# Patient Record
Sex: FEMALE | Race: WHITE | Employment: FULL TIME | URBAN - METROPOLITAN AREA
[De-identification: names, ages, dates, MRNs, and addresses within clinical notes are randomized per-mention and may not be internally consistent; named-entity substitution may affect disease eponyms.]

---

## 2019-03-10 ENCOUNTER — APPOINTMENT (OUTPATIENT)
Dept: GENERAL RADIOLOGY | Age: 49
End: 2019-03-10
Payer: COMMERCIAL

## 2019-03-10 ENCOUNTER — HOSPITAL ENCOUNTER (EMERGENCY)
Age: 49
Discharge: HOME | End: 2019-03-10
Attending: EMERGENCY MEDICINE
Payer: COMMERCIAL

## 2019-03-10 VITALS
HEIGHT: 64 IN | OXYGEN SATURATION: 95 % | SYSTOLIC BLOOD PRESSURE: 113 MMHG | TEMPERATURE: 98.1 F | WEIGHT: 125 LBS | DIASTOLIC BLOOD PRESSURE: 70 MMHG | BODY MASS INDEX: 21.34 KG/M2 | RESPIRATION RATE: 15 BRPM | HEART RATE: 95 BPM

## 2019-03-10 DIAGNOSIS — R00.2 PALPITATIONS: ICD-10-CM

## 2019-03-10 DIAGNOSIS — R07.89 ATYPICAL CHEST PAIN: Primary | ICD-10-CM

## 2019-03-10 LAB
A/G RATIO: 1.7 (ref 1.1–2.2)
ALBUMIN SERPL-MCNC: 4.6 G/DL (ref 3.4–5)
ALP BLD-CCNC: 52 U/L (ref 40–129)
ALT SERPL-CCNC: 13 U/L (ref 10–40)
ANION GAP SERPL CALCULATED.3IONS-SCNC: 15 MMOL/L (ref 3–16)
AST SERPL-CCNC: 26 U/L (ref 15–37)
BASOPHILS ABSOLUTE: 0 K/UL (ref 0–0.2)
BASOPHILS RELATIVE PERCENT: 0.7 %
BILIRUB SERPL-MCNC: 0.7 MG/DL (ref 0–1)
BUN BLDV-MCNC: 15 MG/DL (ref 7–20)
CALCIUM SERPL-MCNC: 9.2 MG/DL (ref 8.3–10.6)
CHLORIDE BLD-SCNC: 101 MMOL/L (ref 99–110)
CO2: 25 MMOL/L (ref 21–32)
CREAT SERPL-MCNC: 0.6 MG/DL (ref 0.6–1.1)
EOSINOPHILS ABSOLUTE: 0.1 K/UL (ref 0–0.6)
EOSINOPHILS RELATIVE PERCENT: 1 %
GFR AFRICAN AMERICAN: >60
GFR NON-AFRICAN AMERICAN: >60
GLOBULIN: 2.7 G/DL
GLUCOSE BLD-MCNC: 95 MG/DL (ref 70–99)
HCT VFR BLD CALC: 42.4 % (ref 36–48)
HEMOGLOBIN: 14.5 G/DL (ref 12–16)
LYMPHOCYTES ABSOLUTE: 1.7 K/UL (ref 1–5.1)
LYMPHOCYTES RELATIVE PERCENT: 30.3 %
MCH RBC QN AUTO: 32.9 PG (ref 26–34)
MCHC RBC AUTO-ENTMCNC: 34.3 G/DL (ref 31–36)
MCV RBC AUTO: 96 FL (ref 80–100)
MONOCYTES ABSOLUTE: 0.4 K/UL (ref 0–1.3)
MONOCYTES RELATIVE PERCENT: 7.4 %
NEUTROPHILS ABSOLUTE: 3.4 K/UL (ref 1.7–7.7)
NEUTROPHILS RELATIVE PERCENT: 60.6 %
PDW BLD-RTO: 13.1 % (ref 12.4–15.4)
PLATELET # BLD: 237 K/UL (ref 135–450)
PMV BLD AUTO: 8.9 FL (ref 5–10.5)
POTASSIUM REFLEX MAGNESIUM: 3.8 MMOL/L (ref 3.5–5.1)
RBC # BLD: 4.41 M/UL (ref 4–5.2)
SODIUM BLD-SCNC: 141 MMOL/L (ref 136–145)
TOTAL PROTEIN: 7.3 G/DL (ref 6.4–8.2)
TROPONIN: <0.01 NG/ML
TROPONIN: <0.01 NG/ML
WBC # BLD: 5.7 K/UL (ref 4–11)

## 2019-03-10 PROCEDURE — 71046 X-RAY EXAM CHEST 2 VIEWS: CPT

## 2019-03-10 PROCEDURE — 99285 EMERGENCY DEPT VISIT HI MDM: CPT

## 2019-03-10 PROCEDURE — 93005 ELECTROCARDIOGRAM TRACING: CPT | Performed by: PHYSICIAN ASSISTANT

## 2019-03-10 PROCEDURE — 6370000000 HC RX 637 (ALT 250 FOR IP): Performed by: PHYSICIAN ASSISTANT

## 2019-03-10 PROCEDURE — 85025 COMPLETE CBC W/AUTO DIFF WBC: CPT

## 2019-03-10 PROCEDURE — 84484 ASSAY OF TROPONIN QUANT: CPT

## 2019-03-10 PROCEDURE — 80053 COMPREHEN METABOLIC PANEL: CPT

## 2019-03-10 RX ADMIN — ASPIRIN 325 MG: 325 TABLET, DELAYED RELEASE ORAL at 12:01

## 2019-03-10 ASSESSMENT — HEART SCORE: ECG: 0

## 2019-03-10 ASSESSMENT — ENCOUNTER SYMPTOMS
EYES NEGATIVE: 1
RESPIRATORY NEGATIVE: 1
GASTROINTESTINAL NEGATIVE: 1

## 2019-03-11 LAB
EKG ATRIAL RATE: 79 BPM
EKG ATRIAL RATE: 90 BPM
EKG DIAGNOSIS: NORMAL
EKG DIAGNOSIS: NORMAL
EKG P AXIS: 43 DEGREES
EKG P AXIS: 47 DEGREES
EKG P-R INTERVAL: 154 MS
EKG P-R INTERVAL: 160 MS
EKG Q-T INTERVAL: 380 MS
EKG Q-T INTERVAL: 394 MS
EKG QRS DURATION: 80 MS
EKG QRS DURATION: 80 MS
EKG QTC CALCULATION (BAZETT): 451 MS
EKG QTC CALCULATION (BAZETT): 464 MS
EKG R AXIS: 56 DEGREES
EKG R AXIS: 72 DEGREES
EKG T AXIS: 55 DEGREES
EKG T AXIS: 64 DEGREES
EKG VENTRICULAR RATE: 79 BPM
EKG VENTRICULAR RATE: 90 BPM

## 2019-03-11 PROCEDURE — 93010 ELECTROCARDIOGRAM REPORT: CPT | Performed by: INTERNAL MEDICINE

## 2019-03-12 ASSESSMENT — ENCOUNTER SYMPTOMS
RESPIRATORY NEGATIVE: 1
GASTROINTESTINAL NEGATIVE: 1
EYES NEGATIVE: 1

## 2019-11-05 ENCOUNTER — HOSPITAL ENCOUNTER (OUTPATIENT)
Dept: MRI IMAGING | Age: 49
Discharge: HOME | End: 2019-11-05
Payer: COMMERCIAL

## 2019-11-05 DIAGNOSIS — M25.552 LEFT HIP PAIN: ICD-10-CM

## 2019-11-05 PROCEDURE — 73721 MRI JNT OF LWR EXTRE W/O DYE: CPT

## 2020-03-16 ENCOUNTER — APPOINTMENT (OUTPATIENT)
Dept: CT IMAGING | Age: 50
End: 2020-03-16
Payer: COMMERCIAL

## 2020-03-16 ENCOUNTER — HOSPITAL ENCOUNTER (EMERGENCY)
Age: 50
Discharge: HOME | End: 2020-03-16
Attending: EMERGENCY MEDICINE
Payer: COMMERCIAL

## 2020-03-16 VITALS
BODY MASS INDEX: 24.74 KG/M2 | DIASTOLIC BLOOD PRESSURE: 61 MMHG | WEIGHT: 126 LBS | OXYGEN SATURATION: 96 % | SYSTOLIC BLOOD PRESSURE: 106 MMHG | RESPIRATION RATE: 14 BRPM | TEMPERATURE: 99.8 F | HEIGHT: 60 IN | HEART RATE: 88 BPM

## 2020-03-16 PROCEDURE — 99284 EMERGENCY DEPT VISIT MOD MDM: CPT

## 2020-03-16 PROCEDURE — 72125 CT NECK SPINE W/O DYE: CPT

## 2020-03-16 PROCEDURE — 96374 THER/PROPH/DIAG INJ IV PUSH: CPT

## 2020-03-16 PROCEDURE — 6360000002 HC RX W HCPCS: Performed by: PHYSICIAN ASSISTANT

## 2020-03-16 PROCEDURE — 96375 TX/PRO/DX INJ NEW DRUG ADDON: CPT

## 2020-03-16 PROCEDURE — 2580000003 HC RX 258: Performed by: PHYSICIAN ASSISTANT

## 2020-03-16 RX ORDER — DIPHENHYDRAMINE HYDROCHLORIDE 50 MG/ML
12.5 INJECTION INTRAMUSCULAR; INTRAVENOUS ONCE
Status: COMPLETED | OUTPATIENT
Start: 2020-03-16 | End: 2020-03-16

## 2020-03-16 RX ORDER — LORAZEPAM 2 MG/ML
0.5 INJECTION INTRAMUSCULAR ONCE
Status: COMPLETED | OUTPATIENT
Start: 2020-03-16 | End: 2020-03-16

## 2020-03-16 RX ORDER — 0.9 % SODIUM CHLORIDE 0.9 %
500 INTRAVENOUS SOLUTION INTRAVENOUS ONCE
Status: COMPLETED | OUTPATIENT
Start: 2020-03-16 | End: 2020-03-16

## 2020-03-16 RX ORDER — TRAMADOL HYDROCHLORIDE 50 MG/1
50 TABLET ORAL EVERY 4 HOURS PRN
Qty: 18 TABLET | Refills: 0 | Status: SHIPPED | OUTPATIENT
Start: 2020-03-16 | End: 2020-03-19

## 2020-03-16 RX ORDER — METOCLOPRAMIDE HYDROCHLORIDE 5 MG/ML
10 INJECTION INTRAMUSCULAR; INTRAVENOUS ONCE
Status: COMPLETED | OUTPATIENT
Start: 2020-03-16 | End: 2020-03-16

## 2020-03-16 RX ORDER — ONDANSETRON 4 MG/1
4 TABLET, FILM COATED ORAL EVERY 8 HOURS PRN
Qty: 20 TABLET | Refills: 0 | Status: SHIPPED | OUTPATIENT
Start: 2020-03-16

## 2020-03-16 RX ADMIN — LORAZEPAM 0.5 MG: 2 INJECTION INTRAMUSCULAR; INTRAVENOUS at 19:57

## 2020-03-16 RX ADMIN — SODIUM CHLORIDE 500 ML: 9 INJECTION, SOLUTION INTRAVENOUS at 19:57

## 2020-03-16 RX ADMIN — DIPHENHYDRAMINE HYDROCHLORIDE 12.5 MG: 50 INJECTION, SOLUTION INTRAMUSCULAR; INTRAVENOUS at 19:57

## 2020-03-16 RX ADMIN — METOCLOPRAMIDE 10 MG: 5 INJECTION, SOLUTION INTRAMUSCULAR; INTRAVENOUS at 19:57

## 2020-03-16 ASSESSMENT — PAIN DESCRIPTION - LOCATION: LOCATION: HEAD

## 2020-03-16 ASSESSMENT — PAIN SCALES - GENERAL: PAINLEVEL_OUTOF10: 7

## 2020-03-16 ASSESSMENT — PAIN DESCRIPTION - PAIN TYPE: TYPE: ACUTE PAIN

## 2020-03-16 ASSESSMENT — PAIN DESCRIPTION - DESCRIPTORS: DESCRIPTORS: HEADACHE;BURNING

## 2020-03-17 NOTE — ED PROVIDER NOTES
Emergency Department Provider Note     Location: [place] [address] ED  3/16/2020    I independently performed a history and physical on [address]. All diagnostic, treatment, and disposition decisions were made by myself in conjunction with the mid-level provider. Briefly, this is a 47 y.o. female here for head injury. Patient fell on Friday while walking into physical therapy. Patient fell backwards striking her head in the parking lot. Patient did have short-term of loss of consciousness. Patient called her primary care provider who referred her for an outpatient CT and she was called and told to come the emergency department because the CT identified a skull fracture. Patient does report headache, dizziness, and nausea. ED Triage Vitals [03/16/20 1749]   BP Temp Temp Source Pulse Resp SpO2 Height Weight   (!) 142/78 99.8 °F (37.7 °C) Temporal 109 16 97 % 5' (1.524 m) 126 lb (57.2 kg)        Patient resting comfortably in no acute distress. Heart is regular rate and rhythm. Lungs clear to auscultation bilaterally. Abdomen is soft, nondistended, and nontender. No peripheral edema noted. Cranial nerves intact. Strength and sensation intact. Speech is clear. Patient does have nystagmus noted. Patient reports dizziness with change in position. Symptoms consistent with vertigo. Patient seen and examined. Vital signs able for pulse 109. Physical exam as documented above. Able to obtain outside head CT report which is notable for a right occipital skull fracture without displacement or associated intracranial hemorrhage. Obtained CT C-spine was negative for injury. Patient symptoms consistent with concussion. Neurosurgery consulted and they recommend outpatient follow-up. Will have patient follow-up with primary care provider as well.           Ct Cervical Spine Wo Contrast    Result Date: 3/16/2020  EXAMINATION: CT OF THE CERVICAL SPINE WITHOUT CONTRAST 3/16/2020 6:20 pm TECHNIQUE: CT of the cervical spine was performed without the administration of intravenous contrast. Multiplanar reformatted images are provided for review. Dose modulation, iterative reconstruction, and/or weight based adjustment of the mA/kV was utilized to reduce the radiation dose to as low as reasonably achievable. COMPARISON: None. HISTORY: ORDERING SYSTEM PROVIDED HISTORY: head injury TECHNOLOGIST PROVIDED HISTORY: If patient is on cardiac monitor and/or pulse ox, they may be taken off cardiac monitor and pulse ox, left on O2 if currently on. All monitors reattached when patient returns to room. Reason for exam:->head injury Is the patient pregnant?->No Reason for Exam: Head Injury (was using crutches and fell off sidewalk, hit back of head on Friday. Had MRI at [facility]. Told to come to ER for skull fx. c/o dizziness. ) FINDINGS: BONES/ALIGNMENT: There is no acute fracture or traumatic malalignment involving the cervical spine. Linear lucency seen coursing through the right occipital bone, consistent with a nondisplaced fracture. . DEGENERATIVE CHANGES: No significant degenerative changes. SOFT TISSUES: There is no prevertebral soft tissue swelling. 1. No evidence of an acute fracture or traumatic malalignment involving the cervical spine 2. Nondisplaced, nondepressed right occipital bone fracture 3. Critical results were called by Dr. Jazmin Joy to Franklyn Gutierrezde on 3/16/2020 at 18:45. No results found for this visit on 03/16/20. For further details of [facility] emergency department encounter, please see [facility] documentation. This chart was generated in part by using Dragon Dictation system and may contain errors related to that system including errors in grammar, punctuation, and spelling, as well as words and phrases that may be inappropriate. If there are any questions or concerns please feel free to contact the dictating provider for clarification. Sacha Holcomb MD  03/16/20 2041

## 2020-03-17 NOTE — ED PROVIDER NOTES
all reviewed and agreed with or any disagreements were addressed  in the HPI. REVIEW OF SYSTEMS    (2-9 systems for level 4, 10 or more for level 5)     Review of Systems    Positives and Pertinent negatives as per HPI. Except as noted abovein the ROS, all other systems were reviewed and negative. PAST MEDICAL HISTORY     Past Medical History:   Diagnosis Date    Acid reflux     Anxiety     IBS (irritable bowel syndrome)     PONV (postoperative nausea and vomiting)     Prolonged emergence from general anesthesia     Salmonella 06/10/2018    + gi cx salmonella species    Shoulder injury     chronic pain r/t shoulder injury    Unspecified cerebral artery occlusion with cerebral infarction     mild stroke after taking birth control         SURGICAL HISTORY     Past Surgical History:   Procedure Laterality Date    COLONOSCOPY      COLONOSCOPY  6/29/15    normal    ENDOSCOPY, COLON, DIAGNOSTIC      HYSTERECTOMY      SHOULDER SURGERY Left     UPPER GASTROINTESTINAL ENDOSCOPY  6/29/15    normal         CURRENTMEDICATIONS       Previous Medications    LORAZEPAM (ATIVAN) 0.5 MG TABLET    Take 0.5 mg by mouth as needed for Anxiety    METOPROLOL TARTRATE (LOPRESSOR) 25 MG TABLET    Take 25 mg by mouth daily         ALLERGIES     Prednisolone;  Sudafed [pseudoephedrine hcl]; Sulfa antibiotics; and Tape [adhesive tape]    FAMILYHISTORY       Family History   Problem Relation Age of Onset    Cancer Mother     High Blood Pressure Father     Other Father           SOCIAL HISTORY       Social History     Socioeconomic History    Marital status:      Spouse name: None    Number of children: None    Years of education: None    Highest education level: None   Occupational History    None   Social Needs    Financial resource strain: None    Food insecurity     Worry: None     Inability: None    Transportation needs     Medical: None     Non-medical: None   Tobacco Use    Smoking status: Never ear normal. No hemotympanum. Nose: Nose normal. No signs of injury. Right Nostril: No epistaxis or septal hematoma. Left Nostril: No epistaxis or septal hematoma. Mouth/Throat:      Lips: Pink. Mouth: Mucous membranes are moist. No injury. Pharynx: Oropharynx is clear. Uvula midline. Eyes:      General:         Right eye: No discharge. Left eye: No discharge. Extraocular Movements: Extraocular movements intact. Right eye: Nystagmus present. Left eye: Nystagmus present. Conjunctiva/sclera: Conjunctivae normal.      Pupils: Pupils are equal, round, and reactive to light. Comments: Bilateral    Neck:      Musculoskeletal: Full passive range of motion without pain and normal range of motion. No neck rigidity, spinous process tenderness or muscular tenderness. Trachea: Trachea and phonation normal.   Cardiovascular:      Rate and Rhythm: Normal rate and regular rhythm. Pulses: Normal pulses. Heart sounds: Normal heart sounds. No murmur. No friction rub. No gallop. Pulmonary:      Effort: Pulmonary effort is normal. No respiratory distress. Breath sounds: Normal breath sounds. No wheezing or rales. Chest:      Chest wall: No lacerations, deformity, swelling, tenderness, crepitus or edema. Abdominal:      General: Bowel sounds are normal. There is no abdominal bruit. There are no signs of injury. Palpations: Abdomen is soft. Tenderness: There is no abdominal tenderness. Musculoskeletal:      Cervical back: Normal.      Thoracic back: Normal.      Lumbar back: Normal.      Right lower leg: No edema. Left lower leg: No edema. Comments:  Strength 5/5, sensation intact- Motor strength 5/5 and symmetric to UE Deltoids/trapezius, biceps and wrist extensors. Sensation to light touch intact and symmetric to bilateral UE dermatomes. Strength 5/5  to RLE hip flexors, knees and ankles.  Sensation to light touch intact in bilateral LE dermatomes. Left lower extremity strength not assessed secondary to recent surgery status and range of motion limitations per surgeon. Gait also not assessed. Skin:     General: Skin is warm and dry. Capillary Refill: Capillary refill takes less than 2 seconds. Coloration: Skin is not pale. Findings: No bruising or ecchymosis. Comments: Incisions witout sign of current infection    Neurological:      General: No focal deficit present. Mental Status: She is alert, oriented to person, place, and time and easily aroused. GCS: GCS eye subscore is 4. GCS verbal subscore is 5. GCS motor subscore is 6. Cranial Nerves: Cranial nerves are intact. Sensory: Sensation is intact. No sensory deficit. Motor: Motor function is intact. No weakness. Coordination: Coordination is intact. Gait: Gait is intact. Deep Tendon Reflexes:      Reflex Scores:       Patellar reflexes are 2+ on the right side and 2+ on the left side. Psychiatric:         Behavior: Behavior normal. Behavior is cooperative. DIAGNOSTIC RESULTS   LABS:    Labs Reviewed - No data to display    All other labs were within normal range or not returned as of this dictation. EKG: All EKG's are interpreted by the Emergency Department Physician who either signs orCo-signs this chart in the absence of a cardiologist.  Please see their note for interpretation of EKG. RADIOLOGY:   Non-plain film images such as CT, Ultrasound and MRI are read by the radiologist. Valma Fly radiographic images are visualized andpreliminarily interpreted by the  ED Provider with the below findings:        Interpretation [facility] Radiologist below, if available at the time of this note:    CT Cervical Spine WO Contrast   Final Result   1. No evidence of an acute fracture or traumatic malalignment involving the   cervical spine   2. Nondisplaced, nondepressed right occipital bone fracture   3. 109    Resp: 16    Temp: 99.8 °F (37.7 °C)    TempSrc: Temporal    SpO2: 97%    Weight: 126 lb (57.2 kg)    Height: 5' (1.524 m)        Patient was given thefollowing medications:  Medications   0.9 % sodium chloride bolus (500 mLs Intravenous New Bag 3/16/20 1957)   diphenhydrAMINE (BENADRYL) injection 12.5 mg (12.5 mg Intravenous Given 3/16/20 1957)   metoclopramide (REGLAN) injection 10 mg (10 mg Intravenous Given 3/16/20 1957)   LORazepam (ATIVAN) injection 0.5 mg (0.5 mg Intravenous Given 3/16/20 1957)          Patient is a 76-year-old female who presents for evaluation after head injury. On exam she is alert and oriented, afebrile well-perfused. She is mildly tachycardic on triage vitals however she is normal rate normal rhythm on my exam.  Trauma exam, no sign of basilar skull fracture or major trauma. She has no midline cervical spine tenderness. She does have tenderness over the occiput, no crepitus or hematoma appreciated. Neuro exam is remarkable for right beating horizontal nystagmus, no vertical or rotary nystagmus appreciated. No other focal neuro deficit appreciated. She has well-appearing arthroscopy incisions, no sign of acute infection. CT cervical spine obtained, no evidence of acute cervical spine fracture. Imaging reviewed from CT head Noncon which was obtained at [address] which reveals nondisplaced right occipital skull fracture with no hemorrhage or in or around the brain. I spoke with patient's PCP on-call, Dr. Juan Antonio Anguiano, discussed patient's current presentation, they will reach out to the patient tomorrow to his schedule follow-up this week.   I also spoke with neurosurgery on-call, Dr. Gavin Mitchell, reviewed the patient's clinical picture and imaging results, he indicated that watching and waiting is really the current best option, no need for admission or prompt outpatient evaluation however he his instruction is to have the patient call his office tomorrow to arrange for management. Results discussed with patient, she agrees with the current plan. We will dose tramadol as needed for pain and antiemetic. She was advised of strict return precautions and also instructed to follow-up with her orthopedic surgeon. Based on patient's clinical history and clinical findings, and absence of peritonitis, sepsis, or toxicity I currently estimate there is low risk for: Intracranial hemorrhage, intra-abdominal injury, perforated bowel, subdural hematoma, AAA, TAA, cauda equina or central cord syndrome, compartment syndrome, epidural mass or lesion, herniated disc causing severe stenosis, tendon or NV injury, fracture or dislocation. We have discussed the symptoms which are most concerning (e.g., bloody stool, fever, changing or worsening pain, vomiting) that necessitate immediate return. Pt is in agreement with the current plan and all questions were addressed. I discussed my PE findings, diagnostic results, assessment and plan for home discharge with my supervisor who agrees with the above stated plan. FINAL IMPRESSION      1. Closed fracture of right side of occipital bone, unspecified occipital fracture type, initial encounter ([facility] [address]    2.  Concussion with loss of consciousness of 30 minutes or less, initial encounter          DISPOSITION/PLAN   DISPOSITION Decision To Discharge 03/16/2020 08:43:54 PM      PATIENT REFERREDTO:  Trip Sanchez MD  [phone]    Call in 1 day      Vergia Severance, MD  [facility] [place] [address] [address] [facility]  [phone]    Call in 1 day      [facility] ED  [address] [facility]  [phone]  Go to   If symptoms worsen      DISCHARGE MEDICATIONS:  New Prescriptions    ONDANSETRON (ZOFRAN) 4 MG TABLET    Take 1 tablet by mouth every 8 hours as needed for Nausea    TRAMADOL (ULTRAM) 50 MG TABLET    Take 1 tablet by mouth every 4 hours as needed for Pain for up to 3 days. Intended supply: 3 days.  Take lowest dose possible to manage pain       DISCONTINUED MEDICATIONS:  Discontinued Medications    No medications on file              (Please note that portions ofthis note were completed with a voice recognition program.  Efforts were made to edit the dictations but occasionally words are mis-transcribed.)    Vernell Andujar (electronically signed)        Vernell Andujar  03/16/20 2979

## 2021-10-05 ENCOUNTER — HOSPITAL ENCOUNTER (EMERGENCY)
Age: 51
Discharge: HOME | End: 2021-10-05
Payer: COMMERCIAL

## 2021-10-05 ENCOUNTER — APPOINTMENT (OUTPATIENT)
Dept: GENERAL RADIOLOGY | Age: 51
End: 2021-10-05
Payer: COMMERCIAL

## 2021-10-05 ENCOUNTER — APPOINTMENT (OUTPATIENT)
Dept: CT IMAGING | Age: 51
End: 2021-10-05
Payer: COMMERCIAL

## 2021-10-05 VITALS
WEIGHT: 137 LBS | TEMPERATURE: 97.9 F | RESPIRATION RATE: 16 BRPM | DIASTOLIC BLOOD PRESSURE: 74 MMHG | SYSTOLIC BLOOD PRESSURE: 132 MMHG | BODY MASS INDEX: 26.9 KG/M2 | HEART RATE: 78 BPM | OXYGEN SATURATION: 98 % | HEIGHT: 60 IN

## 2021-10-05 DIAGNOSIS — S70.02XA CONTUSION OF LEFT HIP, INITIAL ENCOUNTER: ICD-10-CM
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
## 2021-10-05 DIAGNOSIS — R51.9 ACUTE NONINTRACTABLE HEADACHE, UNSPECIFIED HEADACHE TYPE: Primary | ICD-10-CM

## 2021-10-05 PROCEDURE — 99282 EMERGENCY DEPT VISIT SF MDM: CPT

## 2021-10-05 PROCEDURE — 70450 CT HEAD/BRAIN W/O DYE: CPT

## 2021-10-05 PROCEDURE — 36415 COLL VENOUS BLD VENIPUNCTURE: CPT

## 2021-10-05 PROCEDURE — 6360000002 HC RX W HCPCS: Performed by: NURSE PRACTITIONER

## 2021-10-05 PROCEDURE — 96375 TX/PRO/DX INJ NEW DRUG ADDON: CPT

## 2021-10-05 PROCEDURE — 96374 THER/PROPH/DIAG INJ IV PUSH: CPT

## 2021-10-05 PROCEDURE — 2580000003 HC RX 258: Performed by: NURSE PRACTITIONER

## 2021-10-05 PROCEDURE — 73502 X-RAY EXAM HIP UNI 2-3 VIEWS: CPT

## 2021-10-05 RX ORDER — 0.9 % SODIUM CHLORIDE 0.9 %
1000 INTRAVENOUS SOLUTION INTRAVENOUS ONCE
Status: COMPLETED | OUTPATIENT
Start: 2021-10-05 | End: 2021-10-05

## 2021-10-05 RX ORDER — DIPHENHYDRAMINE HYDROCHLORIDE 50 MG/ML
12.5 INJECTION INTRAMUSCULAR; INTRAVENOUS ONCE
Status: COMPLETED | OUTPATIENT
Start: 2021-10-05 | End: 2021-10-05

## 2021-10-05 RX ORDER — VILAZODONE HYDROCHLORIDE 10 MG/1
10 TABLET ORAL DAILY
COMMUNITY
Start: 2021-01-22

## 2021-10-05 RX ORDER — ACETAMINOPHEN, ASPIRIN AND CAFFEINE 250; 250; 65 MG/1; MG/1; MG/1
1 TABLET, FILM COATED ORAL ONCE
Status: DISCONTINUED | OUTPATIENT
Start: 2021-10-05 | End: 2021-10-05 | Stop reason: HOSPADM

## 2021-10-05 RX ORDER — ACETAMINOPHEN, ASPIRIN AND CAFFEINE 250; 250; 65 MG/1; MG/1; MG/1
1 TABLET, FILM COATED ORAL EVERY 6 HOURS PRN
Qty: 20 TABLET | Refills: 0 | Status: SHIPPED | OUTPATIENT
Start: 2021-10-05

## 2021-10-05 RX ORDER — PROCHLORPERAZINE EDISYLATE 5 MG/ML
10 INJECTION INTRAMUSCULAR; INTRAVENOUS ONCE
Status: DISCONTINUED | OUTPATIENT
Start: 2021-10-05 | End: 2021-10-05 | Stop reason: HOSPADM

## 2021-10-05 RX ORDER — KETOROLAC TROMETHAMINE 30 MG/ML
15 INJECTION, SOLUTION INTRAMUSCULAR; INTRAVENOUS ONCE
Status: COMPLETED | OUTPATIENT
Start: 2021-10-05 | End: 2021-10-05

## 2021-10-05 RX ADMIN — DIPHENHYDRAMINE HYDROCHLORIDE 12.5 MG: 50 INJECTION, SOLUTION INTRAMUSCULAR; INTRAVENOUS at 14:52

## 2021-10-05 RX ADMIN — KETOROLAC TROMETHAMINE 15 MG: 30 INJECTION, SOLUTION INTRAMUSCULAR at 14:52

## 2021-10-05 RX ADMIN — SODIUM CHLORIDE 1000 ML: 9 INJECTION, SOLUTION INTRAVENOUS at 14:52

## 2021-10-05 ASSESSMENT — PAIN DESCRIPTION - DESCRIPTORS: DESCRIPTORS: PRESSURE

## 2021-10-05 ASSESSMENT — PAIN SCALES - GENERAL: PAINLEVEL_OUTOF10: 4

## 2021-10-05 ASSESSMENT — PAIN DESCRIPTION - LOCATION: LOCATION: HEAD

## 2021-10-05 ASSESSMENT — PAIN DESCRIPTION - PAIN TYPE: TYPE: ACUTE PAIN

## 2021-10-05 NOTE — ED PROVIDER NOTES
[address]  EMERGENCY DEPARTMENT ENCOUNTER        Pt Name: Lashell Leong  MRN: [id number]  [place] 1970  Date of evaluation: 10/5/2021  Provider: KANDY Del Valle - MAK  PCP: Freddie Erwin MD  Note Started: 2:13 PM EDT       SARA. I have evaluated this patient. My supervising physician was available for consultation. CHIEF COMPLAINT       Chief Complaint   Patient presents with    Headache     Pt states headache for 9 days. Pt states that she has tried Ibuprofen and Aleve without relief. Pt states that she fell and hit head on Tuesday. Pt denies LOC. Pt states that she also hurt her left hip. HISTORY OF PRESENT ILLNESS   (Location, Timing/Onset, Context/Setting, Quality, Duration, Modifying Factors, Severity, Associated Signs and Symptoms)  Note limiting factors. Chief Complaint: Headache    Lashell Leong is a 45 y.o. female with medical history of GERD, hysterectomy, anxiety, IBS, Salmonella, CVA who presents emergency department with complaints of posterior headache worse the right occipital x9 days. Patient notes that she does get frequent intermittent headaches after a fall and struck her head and sustained a skull fracture approximately 1 year ago. Since then she does get intermittent headaches. She did take Tylenol for the headache and that seemed to help a little bit. She also took a dose of naproxen, although said it was for left hip pain. She does note that she had a slip and fall 8 days ago. Although the headache occurred 1 day prior to the fall. She also notes she did not sustain any head injury during the fall. She denies any associated visual disturbance, nausea, vomiting, diarrhea, lightheaded, dizzy, syncope, neck stiffness, numbness, tingling, weakness, chest pain, cough, rashes, fevers. She has received the COVID-19 vaccine. She denies being anticoagulated.     Nursing Notes were all reviewed and agreed with or any disagreements were addressed in the HPI. REVIEW OF SYSTEMS    (2-9 systems for level 4, 10 or more for level 5)     Review of Systems    Positives and Pertinent negatives as per HPI. Except as noted above in the ROS, all other systems were reviewed and negative. PAST MEDICAL HISTORY     Past Medical History:   Diagnosis Date    Acid reflux     Anxiety     IBS (irritable bowel syndrome)     PONV (postoperative nausea and vomiting)     Prolonged emergence from general anesthesia     Salmonella 06/10/2018    + gi cx salmonella species    Shoulder injury     chronic pain r/t shoulder injury    Unspecified cerebral artery occlusion with cerebral infarction     mild stroke after taking birth control         SURGICAL HISTORY     Past Surgical History:   Procedure Laterality Date    COLONOSCOPY      COLONOSCOPY  6/29/15    normal    ENDOSCOPY, COLON, DIAGNOSTIC      HYSTERECTOMY      SHOULDER SURGERY Left     UPPER GASTROINTESTINAL ENDOSCOPY  6/29/15    normal         CURRENTMEDICATIONS       Discharge Medication List as of 10/5/2021  4:47 PM      CONTINUE these medications which have NOT CHANGED    Details   vilazodone HCl (VIIBRYD) 10 MG TABS Take 10 mg by mouth dailyHistorical Med      metoprolol tartrate (LOPRESSOR) 25 MG tablet Take 25 mg by mouth dailyHistorical Med      ondansetron (ZOFRAN) 4 MG tablet Take 1 tablet by mouth every 8 hours as needed for Nausea, Disp-20 tablet, R-0Print      LORazepam (ATIVAN) 0.5 MG tablet Take 0.5 mg by mouth as needed for AnxietyHistorical Med               ALLERGIES     Prednisolone, Sudafed [pseudoephedrine hcl], Sulfa antibiotics, and Tape [adhesive tape]    FAMILYHISTORY       Family History   Problem Relation Age of Onset    Cancer Mother     High Blood Pressure Father     Other Father           SOCIAL HISTORY       Social History     Tobacco Use    Smoking status: Never Smoker    Smokeless tobacco: Never Used   Substance Use Topics    Alcohol use:  Yes Alcohol/week: 2.0 - 3.0 standard drinks     Types: 2 - 3 Cans of beer per week     Comment: daily    Drug use: No       SCREENINGS             PHYSICAL EXAM    (up to 7 for level 4, 8 or more for level 5)     ED Triage Vitals [10/05/21 1342]   BP Temp Temp Source Pulse Resp SpO2 Height Weight   132/74 97.9 °F (36.6 °C) Oral 78 16 98 % 5' (1.524 m) 137 lb (62.1 kg)       Physical Exam  Vitals and nursing note reviewed. Constitutional:       General: She is awake. Appearance: Normal appearance. She is well-developed and overweight. HENT:      Head: Normocephalic and atraumatic. Right Ear: Hearing and external ear normal.      Left Ear: Hearing and external ear normal.      Nose: Nose normal.      Right Sinus: No maxillary sinus tenderness or frontal sinus tenderness. Left Sinus: No maxillary sinus tenderness or frontal sinus tenderness. Mouth/Throat:      Lips: Pink. Mouth: Mucous membranes are moist.      Pharynx: Oropharynx is clear. Eyes:      General:         Right eye: No discharge. Left eye: No discharge. Extraocular Movements: Extraocular movements intact. Conjunctiva/sclera: Conjunctivae normal.      Pupils: Pupils are equal, round, and reactive to light. Neck:     Cardiovascular:      Rate and Rhythm: Normal rate and regular rhythm. Pulses:           Radial pulses are 2+ on the right side and 2+ on the left side. Dorsalis pedis pulses are 2+ on the right side and 2+ on the left side. Heart sounds: Normal heart sounds. Pulmonary:      Effort: Pulmonary effort is normal. No respiratory distress. Abdominal:      General: Bowel sounds are normal.      Palpations: Abdomen is soft. Tenderness: There is no abdominal tenderness. Musculoskeletal:         General: Normal range of motion. Cervical back: Full passive range of motion without pain and normal range of motion. No spinous process tenderness or muscular tenderness.       Right hip: Normal. Normal range of motion. Left hip: Normal. Normal range of motion. Right upper leg: Normal.      Left upper leg: Normal.      Right knee: Normal.      Left knee: Normal.      Right lower leg: No edema. Left lower leg: No edema. Right ankle: Normal.      Left ankle: Normal.   Skin:     General: Skin is warm and dry. Coloration: Skin is not pale. Neurological:      General: No focal deficit present. Mental Status: She is alert and oriented to person, place, and time. Cranial Nerves: Cranial nerves are intact. Sensory: Sensation is intact. Motor: Motor function is intact. Coordination: Coordination is intact. Gait: Gait is intact. Psychiatric:         Behavior: Behavior normal. Behavior is cooperative. DIAGNOSTIC RESULTS   LABS:    Labs Reviewed - No data to display    When ordered only abnormal lab results are displayed. All other labs were within normal range or not returned as of this dictation. EKG: When ordered, EKG's are interpreted by the Emergency Department Physician in the absence of a cardiologist.  Please see their note for interpretation of EKG. RADIOLOGY:   Non-plain film images such as CT, Ultrasound and MRI are read by the radiologist. Plain radiographic images are visualized and preliminarily interpreted by the ED Provider with the below findings:        Interpretation per the Radiologist below, if available at the time of this note:    XR HIP 2-3 VW W PELVIS LEFT   Final Result   No acute fracture or dislocation. CT Head WO Contrast   Final Result   No acute intracranial abnormality. No results found.         PROCEDURES   Unless otherwise noted below, none     Procedures    CRITICAL CARE TIME   N/A    CONSULTS:  None      EMERGENCY DEPARTMENT COURSE and DIFFERENTIAL DIAGNOSIS/MDM:   Vitals:    Vitals:    10/05/21 1342   BP: 132/74   Pulse: 78   Resp: 16   Temp: 97.9 °F (36.6 °C)   TempSrc: Oral SpO2: 98%   Weight: 137 lb (62.1 kg)   Height: 5' (1.524 m)       Patient was given the following medications:  Medications   prochlorperazine (COMPAZINE) injection 10 mg (10 mg IntraVENous Not Given 10/5/21 1452)   aspirin-acetaminophen-caffeine (EXCEDRIN MIGRAINE) per tablet 1 tablet (1 tablet Oral Not Given 10/5/21 1701)   0.9 % sodium chloride bolus (0 mLs IntraVENous Stopped 10/5/21 1701)   diphenhydrAMINE (BENADRYL) injection 12.5 mg (12.5 mg IntraVENous Given 10/5/21 1452)   ketorolac (TORADOL) injection 15 mg (15 mg IntraVENous Given 10/5/21 1452)         Care of this patient took place during the COVID-19 pandemic emergency. ED COURSE & MEDICAL DECISION MAKING    - The patient presented to the ER with complaints of h/a, left hip pain. Vital signs were reviewed. Exam well-developed, well-nourished female who appears uncomfortable. Peripheral IV placed. Imaging ordered. - Pertinent Labs & Imaging studies reviewed. (See chart for details)   -  Patient seen and evaluated in the emergency department. -  Triage and nursing notes reviewed and incorporated. -  Old chart records reviewed and incorporated. -  SARA. I have evaluated this patient. My supervising physician was available for consultation.  -  Differential diagnosis includes: CVA, TIA, ICH, SAH, aneurysm, dissection, meningitis, glioblastoma, meningioma, metabolic encephalopathy, VTE, SDH, dehydration, sepsis, COVID-19  -  Work-up included:  See above  -  ED treatment included:   Normal saline, Compazine, Benadryl, Toradol, excedrin.   -  Results discussed with patient. Palma Rodriges is a 50-year-old female with complaints of headache x9 days. Patient had a fall 8 days ago and sustained injury to the left hip. She denies any medical assessment that time. She does have a history of chronic headaches since a fall and sustained a skull fracture 1 year ago. She did take Tylenol and naproxen for the symptoms and current pain level is 4/10. Imaging is obtained. Xr hip shows no acute fracture or dislocation. CT head shows no acute intracranial abnormality. She was informed on these results. States the h/a is better and only has mild pressure. Patient was given strict return discharge instructions. Shared decision making is completed and she is stable for discharge at this time. NAD noted. FINAL IMPRESSION      1. Acute nonintractable headache, unspecified headache type    2. Fall, initial encounter    3.  Contusion of left hip, initial encounter          DISPOSITION/PLAN   DISPOSITION        PATIENT REFERRED TO:  Yovany Rothman MD  [place] [id number] [facility]  [phone]    Call in 2 days  As needed, If symptoms worsen    [facility]  [address] [facility]  [phone]  Go to   As needed      DISCHARGE MEDICATIONS:  Discharge Medication List as of 10/5/2021  4:47 PM      START taking these medications    Details   aspirin-acetaminophen-caffeine (EXCEDRIN EXTRA STRENGTH) 250-250-65 MG per tablet Take 1 tablet by mouth every 6 hours as needed for Headaches, Disp-20 tablet, R-0Print             DISCONTINUED MEDICATIONS:  Discharge Medication List as of 10/5/2021  4:47 PM                 (Please note that portions of this note were completed with a voice recognition program.  Efforts were made to edit the dictations but occasionally words are mis-transcribed.)    KANDY Lo CNP (electronically signed)            KANDY Lo CNP  10/05/21 3760

## 2022-02-14 ENCOUNTER — APPOINTMENT (OUTPATIENT)
Dept: GENERAL RADIOLOGY | Age: 52
End: 2022-02-14
Payer: COMMERCIAL

## 2022-02-14 ENCOUNTER — HOSPITAL ENCOUNTER (EMERGENCY)
Age: 52
Discharge: HOME | End: 2022-02-14
Attending: EMERGENCY MEDICINE
Payer: COMMERCIAL

## 2022-02-14 VITALS
TEMPERATURE: 98.2 F | HEART RATE: 73 BPM | WEIGHT: 133 LBS | BODY MASS INDEX: 25.97 KG/M2 | RESPIRATION RATE: 16 BRPM | SYSTOLIC BLOOD PRESSURE: 134 MMHG | DIASTOLIC BLOOD PRESSURE: 78 MMHG | OXYGEN SATURATION: 100 %

## 2022-02-14 DIAGNOSIS — R07.9 CHEST PAIN, UNSPECIFIED TYPE: Primary | ICD-10-CM

## 2022-02-14 LAB
A/G RATIO: 2 (ref 1.1–2.2)
ALBUMIN SERPL-MCNC: 4.5 G/DL (ref 3.4–5)
ALP BLD-CCNC: 62 U/L (ref 40–129)
ALT SERPL-CCNC: 21 U/L (ref 10–40)
ANION GAP SERPL CALCULATED.3IONS-SCNC: 8 MMOL/L (ref 3–16)
AST SERPL-CCNC: 25 U/L (ref 15–37)
BASOPHILS ABSOLUTE: 0 K/UL (ref 0–0.2)
BASOPHILS RELATIVE PERCENT: 0.9 %
BILIRUB SERPL-MCNC: 0.4 MG/DL (ref 0–1)
BUN BLDV-MCNC: 10 MG/DL (ref 7–20)
CALCIUM SERPL-MCNC: 9.7 MG/DL (ref 8.3–10.6)
CHLORIDE BLD-SCNC: 104 MMOL/L (ref 99–110)
CO2: 27 MMOL/L (ref 21–32)
CREAT SERPL-MCNC: 0.6 MG/DL (ref 0.6–1.1)
EKG ATRIAL RATE: 80 BPM
EKG DIAGNOSIS: NORMAL
EKG P AXIS: 49 DEGREES
EKG P-R INTERVAL: 140 MS
EKG Q-T INTERVAL: 384 MS
EKG QRS DURATION: 82 MS
EKG QTC CALCULATION (BAZETT): 442 MS
EKG R AXIS: 77 DEGREES
EKG T AXIS: 67 DEGREES
EKG VENTRICULAR RATE: 80 BPM
EOSINOPHILS ABSOLUTE: 0.1 K/UL (ref 0–0.6)
EOSINOPHILS RELATIVE PERCENT: 1.1 %
GFR AFRICAN AMERICAN: >60
GFR NON-AFRICAN AMERICAN: >60
GLUCOSE BLD-MCNC: 108 MG/DL (ref 70–99)
HCT VFR BLD CALC: 39.9 % (ref 36–48)
HEMOGLOBIN: 13.6 G/DL (ref 12–16)
LYMPHOCYTES ABSOLUTE: 1.2 K/UL (ref 1–5.1)
LYMPHOCYTES RELATIVE PERCENT: 26.3 %
MCH RBC QN AUTO: 32.1 PG (ref 26–34)
MCHC RBC AUTO-ENTMCNC: 34.2 G/DL (ref 31–36)
MCV RBC AUTO: 93.9 FL (ref 80–100)
MONOCYTES ABSOLUTE: 0.3 K/UL (ref 0–1.3)
MONOCYTES RELATIVE PERCENT: 7.3 %
NEUTROPHILS ABSOLUTE: 2.9 K/UL (ref 1.7–7.7)
NEUTROPHILS RELATIVE PERCENT: 64.4 %
PDW BLD-RTO: 13.1 % (ref 12.4–15.4)
PLATELET # BLD: 235 K/UL (ref 135–450)
PMV BLD AUTO: 9 FL (ref 5–10.5)
POTASSIUM REFLEX MAGNESIUM: 4.1 MMOL/L (ref 3.5–5.1)
RBC # BLD: 4.24 M/UL (ref 4–5.2)
SODIUM BLD-SCNC: 139 MMOL/L (ref 136–145)
TOTAL PROTEIN: 6.8 G/DL (ref 6.4–8.2)
TROPONIN: <0.01 NG/ML
WBC # BLD: 4.6 K/UL (ref 4–11)

## 2022-02-14 PROCEDURE — 71045 X-RAY EXAM CHEST 1 VIEW: CPT

## 2022-02-14 PROCEDURE — 80053 COMPREHEN METABOLIC PANEL: CPT

## 2022-02-14 PROCEDURE — 93010 ELECTROCARDIOGRAM REPORT: CPT | Performed by: INTERNAL MEDICINE

## 2022-02-14 PROCEDURE — 36415 COLL VENOUS BLD VENIPUNCTURE: CPT

## 2022-02-14 PROCEDURE — 85025 COMPLETE CBC W/AUTO DIFF WBC: CPT

## 2022-02-14 PROCEDURE — 84484 ASSAY OF TROPONIN QUANT: CPT

## 2022-02-14 PROCEDURE — 99285 EMERGENCY DEPT VISIT HI MDM: CPT

## 2022-02-14 PROCEDURE — 93005 ELECTROCARDIOGRAM TRACING: CPT | Performed by: EMERGENCY MEDICINE

## 2022-02-14 ASSESSMENT — PAIN SCALES - GENERAL: PAINLEVEL_OUTOF10: 6

## 2022-02-14 NOTE — ED NOTES
Discharge instructions reviewed with patient. Patient verbalized understanding. All home medications have been reviewed, questions answered and patient voiced understanding. Given prescriptions, discharge instructions, and appointment times.      Andrés Hennessy RN  02/14/22 [id number]

## 2022-02-14 NOTE — ED PROVIDER NOTES
education: Not on file    Highest education level: Not on file   Occupational History    Not on file   Tobacco Use    Smoking status: Never Smoker    Smokeless tobacco: Never Used   Substance and Sexual Activity    Alcohol use: Yes     Alcohol/week: 2.0 - 3.0 standard drinks     Types: 2 - 3 Cans of beer per week     Comment: daily    Drug use: No    Sexual activity: Not on file   Other Topics Concern    Not on file   Social History Narrative    Not on file     Social Determinants of Health     Financial Resource Strain:     Difficulty of Paying Living Expenses: Not on file   Food Insecurity:     Worried About Running Out of Food in the Last Year: Not on file    Rashard of Food in the Last Year: Not on file   Transportation Needs:     Lack of Transportation (Medical): Not on file    Lack of Transportation (Non-Medical): Not on file   Physical Activity:     Days of Exercise per Week: Not on file    Minutes of Exercise per Session: Not on file   Stress:     Feeling of Stress : Not on file   Social Connections:     Frequency of Communication with Friends and Family: Not on file    Frequency of Social Gatherings with Friends and Family: Not on file    Attends Mu-ism Services: Not on file    Active Member of [address] [place] [address] [employer] or Organizations: Not on file    Attends Club or Organization Meetings: Not on file    Marital Status: Not on file   Intimate Partner Violence:     Fear of Current or Ex-Partner: Not on file    Emotionally Abused: Not on file    Physically Abused: Not on file    Sexually Abused: Not on file   Housing Stability:     Unable to Pay for Housing in the Last Year: Not on file    Number of [place] in the Last Year: Not on file    Unstable Housing in the Last Year: Not on file     No current facility-administered medications for this encounter.      Current Outpatient Medications   Medication Sig Dispense Refill    vilazodone HCl (VIIBRYD) 10 MG TABS Take 10 mg by mouth daily      aspirin-acetaminophen-caffeine (EXCEDRIN EXTRA STRENGTH) 250-250-65 MG per tablet Take 1 tablet by mouth every 6 hours as needed for Headaches 20 tablet 0    metoprolol tartrate (LOPRESSOR) 25 MG tablet Take 25 mg by mouth daily      ondansetron (ZOFRAN) 4 MG tablet Take 1 tablet by mouth every 8 hours as needed for Nausea 20 tablet 0    LORazepam (ATIVAN) 0.5 MG tablet Take 0.5 mg by mouth as needed for Anxiety       Allergies   Allergen Reactions    Prednisolone Swelling     Joint pain    Sudafed [Pseudoephedrine Hcl]      Affects Heart. Had to go to hospital.    Sulfa Antibiotics Nausea Only    Tape [place] Tape] Rash       REVIEW OF SYSTEMS      General:  No fevers  Eyes:  No recent vison changes  ENT:  No sore throat, no nasal congestion  Cardiovascular:  no palpitations  Respiratory:   no cough, no wheezing  Gastrointestinal:  No abdominal pain, no vomiting, no diarrhea  Musculoskeletal:  No muscle pain, no joint pain  Skin:  No rash   Neurologic:  No speech problems, no headache, no extremity numbness, no extremity weakness  Genitourinary:  No dysuria  Extremities:  no edema, no pain      Unless otherwise stated in this report, this patient's positive and negative responses for review of systems (constitutional, eyes, ENT, cardiovascular, respiratory, gastrointestinal, neurological, genitourinary, musculoskeletal, integument systems and systems related to the presenting problem) are either stated in the preceding paragraph, were not pertinent or were negative for the symptoms and/or complaints related to the medical problem. PHYSICAL EXAM  BP (!) 143/81   Pulse 80   Temp 98.2 °F (36.8 °C)   Resp 17   Wt 133 lb (60.3 kg)   SpO2 98%   BMI 25.97 kg/m²   GENERAL APPEARANCE: Awake and alert. Cooperative. No acute distress. HEAD: Normocephalic. Atraumatic. EYES: EOM's grossly intact. ENT: Mucous membranes are moist.   NECK: Supple, trachea midline. HEART: RRR. LUNGS: Respirations unlabored. CTAB. Good air exchange. No wheezes, rales, or rhonchi. Speaking comfortably in full sentences. ABDOMEN: Soft. Non-distended. Non-tender. No guarding or rebound. EXTREMITIES: No peripheral edema. MAEE. No acute deformities. SKIN: Warm, dry and intact. No acute rashes. NEUROLOGICAL: Alert and oriented X 3. CN II-XII grossly intact. Strength 5/5, sensation intact. PSYCHIATRIC: Normal mood and affect. LABS  I have reviewed all labs for this visit.    Results for orders placed or performed during the hospital encounter of 02/14/22   Troponin   Result Value Ref Range    Troponin <0.01 <0.01 ng/mL   Comprehensive Metabolic Panel w/ Reflex to MG   Result Value Ref Range    Sodium 139 136 - 145 mmol/L    Potassium reflex Magnesium 4.1 3.5 - 5.1 mmol/L    Chloride 104 99 - 110 mmol/L    CO2 27 21 - 32 mmol/L    Anion Gap 8 3 - 16    Glucose 108 (H) 70 - 99 mg/dL    BUN 10 7 - 20 mg/dL    CREATININE 0.6 0.6 - 1.1 mg/dL    GFR Non-African American >60 >60    GFR African American >60 >60    Calcium 9.7 8.3 - 10.6 mg/dL    Total Protein 6.8 6.4 - 8.2 g/dL    Albumin 4.5 3.4 - 5.0 g/dL    Albumin/Globulin Ratio 2.0 1.1 - 2.2    Total Bilirubin 0.4 0.0 - 1.0 mg/dL    Alkaline Phosphatase 62 40 - 129 U/L    ALT 21 10 - 40 U/L    AST 25 15 - 37 U/L   CBC Auto Differential   Result Value Ref Range    WBC 4.6 4.0 - 11.0 K/uL    RBC 4.24 4.00 - 5.20 M/uL    Hemoglobin 13.6 12.0 - 16.0 g/dL    Hematocrit 39.9 36.0 - 48.0 %    MCV 93.9 80.0 - 100.0 fL    MCH 32.1 26.0 - 34.0 pg    MCHC 34.2 31.0 - 36.0 g/dL    RDW 13.1 12.4 - 15.4 %    Platelets 645 891 - 562 K/uL    MPV 9.0 5.0 - 10.5 fL    Neutrophils % 64.4 %    Lymphocytes % 26.3 %    Monocytes % 7.3 %    Eosinophils % 1.1 %    Basophils % 0.9 %    Neutrophils Absolute 2.9 1.7 - 7.7 K/uL    Lymphocytes Absolute 1.2 1.0 - 5.1 K/uL    Monocytes Absolute 0.3 0.0 - 1.3 K/uL    Eosinophils Absolute 0.1 0.0 - 0.6 K/uL    Basophils Absolute 0.0 0.0 - 0.2 K/uL   EKG 12 Lead Result Value Ref Range    Ventricular Rate 80 BPM    Atrial Rate 80 BPM    P-R Interval 140 ms    QRS Duration 82 ms    Q-T Interval 384 ms    QTc Calculation (Bazett) 442 ms    P Axis 49 degrees    R Axis 77 degrees    T Axis 67 degrees    Diagnosis       Normal sinus rhythmCannot rule out Anterior infarct , age undeterminedAbnormal ECGNo previous ECGs availableConfirmed by KAREN Mcfarlane MD ([id number]) on 2/14/2022 11:53:45 AM       EKG  The Ekg interpreted by myself  normal sinus rhythm with a rate of 80  Axis is   Normal  QTc is  normal  Intervals and Durations are unremarkable. No specific ST-T wave changes appreciated. No evidence of acute ischemia. No significant change from prior EKG dated 3/10/2019    Cardiac Monitoring: The cardiac monitor revealed normal sinus rhythm as interpreted by me. The cardiac monitor was ordered secondary to the patient's complaint of chest pain and to monitor the patient for dysrhythmia. RADIOLOGY  X-RAYS: ALL IMAGES INCLUDING PLAIN FILMS, CT, ULTRASOUND AND MRI HAVE BEEN READ BY THE RADIOLOGIST. I have personally reviewed plain film images and have reviewed the radiology reports. XR CHEST 1 VIEW   Final Result   No significant findings in the chest.                    Rechecks: Physical assessment performed. Patient is resting comfortably. She is not having any pain at this time. She is been updated on her lab work findings. Heart Score: HEART SCORE:  History: +2 high suspicion, +1 moderate, 0 low  EKG: +2 significant ST depression, +1 nonspec changes, 0 normal  Age: +2 >65, +1 45-65, 0 <45  Risk factors: +2 >3 or known CAD, +1 1-2, 0 none (factors = HLD, HTN, DM, tobacco, obesity, FHx)  Troponin: +2 >3x normal limit, +1 1-3x normal limit, 0 neg    Heart score: 3    ED COURSE/MDM  Patient seen and evaluated. Here the patient is afebrile with normal vitals signs. Old records reviewed. Here the patient is well-appearing.   The symptoms have been intermittent for the past 5 days. She is in no acute distress currently. EKG shows normal sinus rhythm with no ischemic changes. Troponin is negative, lab work and chest x-ray are reassuring. She has a heart score of 3 placing her at low risk for adverse cardiac event. I counseled the patient regarding her heart score at length. I do think she is appropriate for outpatient follow-up. I will place cardiology referral and will ask her to follow-up with primary care within the next 24 to 48 hours. Labs and imaging reviewed and results discussed with patient. Patient was reassessed as noted above . Plan of care discussed with patient. Patient in agreement with plan. Strict return precautions have been given. I completed a HEART Score to screen for Major Adverse Cardiac Event (MACE) in this patient. The evidence indicates that the patient is very low risk for MACE and this is consistent with my clinical intuition. The risk of further workup or hospitalization for MACE is likely higher than the risk of the patient having a MACE. It is, therefore, in the patient's best interest not to do additional emergent testing or to be hospitalized for MACE. I have discussed with the patient my clinical impression and the result of the HEART Score to screen for MACE, as well as the risks of further testing and hospitalization. I estimate there is LOW risk for PULMONARY EMBOLISM, ACUTE CORONARY SYNDROME, OR THORACIC AORTIC DISSECTION, thus I consider the discharge disposition reasonable. Dawson Rico and I have discussed the diagnosis and risks, and we agree with discharging home to follow-up with their primary doctor. We also discussed returning to the Emergency Department immediately if new or worsening symptoms occur. We have discussed the symptoms which are most concerning (e.g., bloody sputum, fever, worsening pain or shortness of breath, vomiting) that necessitate immediate return.        Patient was given scripts for the following medications. I counseled patient how to take these medications. New Prescriptions    No medications on file     No prescriptions given. CLINICAL IMPRESSION  1. Chest pain, unspecified type        Blood pressure (!) 143/81, pulse 80, temperature 98.2 °F (36.8 °C), resp. rate 17, weight 133 lb (60.3 kg), SpO2 98 %. DISPOSITION  Francisco Javier Newton was discharged to home in stable condition.     (Please note this note was completed with a voice recognition program.  Efforts were made to edit the dictations but occasionally words are mis-transcribed.)       Billie Caldera MD  02/14/22 1022

## 2022-06-17 ENCOUNTER — HOSPITAL ENCOUNTER (OUTPATIENT)
Dept: ULTRASOUND IMAGING | Age: 52
Discharge: HOME | End: 2022-06-17
Payer: COMMERCIAL

## 2022-06-17 DIAGNOSIS — R10.10 UPPER ABDOMINAL PAIN: ICD-10-CM

## 2022-06-17 PROCEDURE — 76705 ECHO EXAM OF ABDOMEN: CPT

## 2022-07-24 ENCOUNTER — APPOINTMENT (OUTPATIENT)
Dept: GENERAL RADIOLOGY | Age: 52
End: 2022-07-24
Payer: COMMERCIAL

## 2022-07-24 ENCOUNTER — HOSPITAL ENCOUNTER (EMERGENCY)
Age: 52
Discharge: HOME | End: 2022-07-24
Payer: COMMERCIAL

## 2022-07-24 ENCOUNTER — APPOINTMENT (OUTPATIENT)
Dept: CT IMAGING | Age: 52
End: 2022-07-24
Payer: COMMERCIAL

## 2022-07-24 VITALS
HEIGHT: 60 IN | HEART RATE: 81 BPM | RESPIRATION RATE: 16 BRPM | WEIGHT: 145 LBS | BODY MASS INDEX: 28.47 KG/M2 | TEMPERATURE: 97.8 F | OXYGEN SATURATION: 100 % | SYSTOLIC BLOOD PRESSURE: 118 MMHG | DIASTOLIC BLOOD PRESSURE: 77 MMHG

## 2022-07-24 DIAGNOSIS — H11.32 SUBCONJUNCTIVAL HEMORRHAGE OF LEFT EYE: ICD-10-CM

## 2022-07-24 DIAGNOSIS — R51.9 NONINTRACTABLE HEADACHE, UNSPECIFIED CHRONICITY PATTERN, UNSPECIFIED HEADACHE TYPE: Primary | ICD-10-CM

## 2022-07-24 DIAGNOSIS — H53.8 BLURRY VISION: ICD-10-CM

## 2022-07-24 DIAGNOSIS — H40.051 ELEVATED IOP, RIGHT: ICD-10-CM

## 2022-07-24 LAB
A/G RATIO: 1.9 (ref 1.1–2.2)
ALBUMIN SERPL-MCNC: 4.7 G/DL (ref 3.4–5)
ALP BLD-CCNC: 74 U/L (ref 40–129)
ALT SERPL-CCNC: 14 U/L (ref 10–40)
ANION GAP SERPL CALCULATED.3IONS-SCNC: 10 MMOL/L (ref 3–16)
AST SERPL-CCNC: 23 U/L (ref 15–37)
BASOPHILS ABSOLUTE: 0 K/UL (ref 0–0.2)
BASOPHILS RELATIVE PERCENT: 0.5 %
BILIRUB SERPL-MCNC: 0.3 MG/DL (ref 0–1)
BUN BLDV-MCNC: 15 MG/DL (ref 7–20)
CALCIUM SERPL-MCNC: 9.9 MG/DL (ref 8.3–10.6)
CHLORIDE BLD-SCNC: 103 MMOL/L (ref 99–110)
CO2: 26 MMOL/L (ref 21–32)
CREAT SERPL-MCNC: 0.6 MG/DL (ref 0.6–1.1)
EKG ATRIAL RATE: 68 BPM
EKG DIAGNOSIS: NORMAL
EKG P AXIS: 25 DEGREES
EKG P-R INTERVAL: 164 MS
EKG Q-T INTERVAL: 398 MS
EKG QRS DURATION: 82 MS
EKG QTC CALCULATION (BAZETT): 423 MS
EKG R AXIS: 33 DEGREES
EKG T AXIS: 35 DEGREES
EKG VENTRICULAR RATE: 68 BPM
EOSINOPHILS ABSOLUTE: 0 K/UL (ref 0–0.6)
EOSINOPHILS RELATIVE PERCENT: 0.6 %
GFR AFRICAN AMERICAN: >60
GFR NON-AFRICAN AMERICAN: >60
GLUCOSE BLD-MCNC: 101 MG/DL (ref 70–99)
HCT VFR BLD CALC: 40.7 % (ref 36–48)
HEMOGLOBIN: 13.7 G/DL (ref 12–16)
LYMPHOCYTES ABSOLUTE: 1 K/UL (ref 1–5.1)
LYMPHOCYTES RELATIVE PERCENT: 18.8 %
MCH RBC QN AUTO: 31.9 PG (ref 26–34)
MCHC RBC AUTO-ENTMCNC: 33.7 G/DL (ref 31–36)
MCV RBC AUTO: 94.5 FL (ref 80–100)
MONOCYTES ABSOLUTE: 0.2 K/UL (ref 0–1.3)
MONOCYTES RELATIVE PERCENT: 4.3 %
NEUTROPHILS ABSOLUTE: 4.1 K/UL (ref 1.7–7.7)
NEUTROPHILS RELATIVE PERCENT: 75.8 %
PDW BLD-RTO: 12.8 % (ref 12.4–15.4)
PLATELET # BLD: 236 K/UL (ref 135–450)
PMV BLD AUTO: 8.5 FL (ref 5–10.5)
POTASSIUM SERPL-SCNC: 4.2 MMOL/L (ref 3.5–5.1)
RBC # BLD: 4.3 M/UL (ref 4–5.2)
SODIUM BLD-SCNC: 139 MMOL/L (ref 136–145)
TOTAL PROTEIN: 7.2 G/DL (ref 6.4–8.2)
TROPONIN: <0.01 NG/ML
WBC # BLD: 5.5 K/UL (ref 4–11)

## 2022-07-24 PROCEDURE — 71045 X-RAY EXAM CHEST 1 VIEW: CPT

## 2022-07-24 PROCEDURE — 6370000000 HC RX 637 (ALT 250 FOR IP): Performed by: PHYSICIAN ASSISTANT

## 2022-07-24 PROCEDURE — 99285 EMERGENCY DEPT VISIT HI MDM: CPT

## 2022-07-24 PROCEDURE — 80053 COMPREHEN METABOLIC PANEL: CPT

## 2022-07-24 PROCEDURE — 93005 ELECTROCARDIOGRAM TRACING: CPT | Performed by: PHYSICIAN ASSISTANT

## 2022-07-24 PROCEDURE — 93010 ELECTROCARDIOGRAM REPORT: CPT | Performed by: INTERNAL MEDICINE

## 2022-07-24 PROCEDURE — 70450 CT HEAD/BRAIN W/O DYE: CPT

## 2022-07-24 PROCEDURE — 84484 ASSAY OF TROPONIN QUANT: CPT

## 2022-07-24 PROCEDURE — 85025 COMPLETE CBC W/AUTO DIFF WBC: CPT

## 2022-07-24 RX ORDER — GLIMEPIRIDE 2 MG/1
1 TABLET ORAL 2 TIMES DAILY
Qty: 1 EACH | Refills: 4 | Status: SHIPPED | OUTPATIENT
Start: 2022-07-24

## 2022-07-24 RX ORDER — TETRACAINE HYDROCHLORIDE 5 MG/ML
1 SOLUTION OPHTHALMIC ONCE
Status: COMPLETED | OUTPATIENT
Start: 2022-07-24 | End: 2022-07-24

## 2022-07-24 RX ADMIN — TETRACAINE HYDROCHLORIDE 1 DROP: 5 SOLUTION OPHTHALMIC at 14:27

## 2022-07-24 ASSESSMENT — VISUAL ACUITY
OU: 20/40
OD: 20/40
OS: 20/40

## 2022-07-24 ASSESSMENT — PAIN SCALES - GENERAL: PAINLEVEL_OUTOF10: 5

## 2022-07-24 ASSESSMENT — PAIN - FUNCTIONAL ASSESSMENT: PAIN_FUNCTIONAL_ASSESSMENT: 0-10

## 2022-07-24 NOTE — ED PROVIDER NOTES
[facility] Emergency Department    CHIEF COMPLAINT  Headache (Pt to ED with c/o headache and blurry vision since Thursday. Pt reports it feels like her eyes are dilated. Yesterday had vessel in left eye pop and it has pressure. Dizziness started this morning. No chest pain or SOB.) and Vision Change      SHARED SERVICE VISIT  Evaluated by SARA. My supervising physician was available for consultation. EKG interpretation provided by attending physician. HISTORY OF PRESENT ILLNESS  Raymundo Spears is a 47 y.o. female who presents to the ED complaining of several day history of visual changes. Patient reports symptom onset was 3 days ago. Reports mild posterior headaches with visual changes. Noted some blood to the left eye yesterday. Reports that blurry vision is bilateral.  She denies use of glasses or contacts. Left eye pressure. States that she has had some mild unsteadiness as well. She denies any numbness, tingling, weakness of extremities. No chest pain or shortness of breath. Denies cough congestion. No fevers chills. States that she has had some nausea as of today. No vomiting. No diarrhea or constipation. She denies urinary symptoms. No leg pain or swelling. Uses no blood thinners. No other complaints, modifying factors or associated symptoms. Nursing notes reviewed.    Past Medical History:   Diagnosis Date    Acid reflux     Anxiety     IBS (irritable bowel syndrome)     PONV (postoperative nausea and vomiting)     Prolonged emergence from general anesthesia     Salmonella 06/10/2018    + gi cx salmonella species    Shoulder injury     chronic pain r/t shoulder injury    Unspecified cerebral artery occlusion with cerebral infarction     mild stroke after taking birth control     Past Surgical History:   Procedure Laterality Date    COLONOSCOPY      COLONOSCOPY  6/29/15    normal    ENDOSCOPY, COLON, DIAGNOSTIC      HYSTERECTOMY (CERVIX STATUS UNKNOWN) SHOULDER SURGERY Left     UPPER GASTROINTESTINAL ENDOSCOPY  6/29/15    normal     Family History   Problem Relation Age of Onset    Cancer Mother     High Blood Pressure Father     Other Father      Social History     Socioeconomic History    Marital status:      Spouse name: Not on file    Number of children: Not on file    Years of education: Not on file    Highest education level: Not on file   Occupational History    Not on file   Tobacco Use    Smoking status: Never    Smokeless tobacco: Never   Substance and Sexual Activity    Alcohol use: Yes     Alcohol/week: 2.0 - 3.0 standard drinks     Types: 2 - 3 Cans of beer per week     Comment: daily    Drug use: No    Sexual activity: Not on file   Other Topics Concern    Not on file   Social History Narrative    Not on file     Social Determinants of Health     Financial Resource Strain: Not on file   Food Insecurity: Not on file   Transportation Needs: Not on file   Physical Activity: Not on file   Stress: Not on file   Social Connections: Not on file   Intimate Partner Violence: Not on file   Housing Stability: Not on file     No current facility-administered medications for this encounter. Current Outpatient Medications   Medication Sig Dispense Refill    vilazodone HCl (VIIBRYD) 10 MG TABS Take 10 mg by mouth daily      aspirin-acetaminophen-caffeine (EXCEDRIN EXTRA STRENGTH) 250-250-65 MG per tablet Take 1 tablet by mouth every 6 hours as needed for Headaches 20 tablet 0    metoprolol tartrate (LOPRESSOR) 25 MG tablet Take 25 mg by mouth daily      ondansetron (ZOFRAN) 4 MG tablet Take 1 tablet by mouth every 8 hours as needed for Nausea 20 tablet 0    LORazepam (ATIVAN) 0.5 MG tablet Take 0.5 mg by mouth as needed for Anxiety       Allergies   Allergen Reactions    Prednisolone Swelling     Joint pain    Sudafed [Pseudoephedrine Hcl]      Affects Heart.  Had to go to hospital.    Sulfa Antibiotics Nausea Only    Tape Ricke Guard Tape] Rash REVIEW OF SYSTEMS  10 systems reviewed, pertinent positives per HPI otherwise noted to be negative    PHYSICAL EXAM  BP (!) 150/95   Pulse 89   Temp 97.8 °F (36.6 °C) (Oral)   Resp 18   Ht 5' (1.524 m)   Wt 145 lb (65.8 kg)   SpO2 98%   BMI 28.32 kg/m²   GENERAL APPEARANCE: Awake and alert. Cooperative. No acute distress. HEAD: Normocephalic. Atraumatic. EYES: PERRL. EOM's grossly intact. Funduscopic exam is unremarkable. Subconjunctival hemorrhage noted to lateral left eye. No periorbital edema or erythema. No proptosis. No globe tenderness. No blood noted to anterior chambers. ENT: Mucous membranes are moist.   NECK: Supple. No meningismus. No midline bony tenderness. HEART: RRR. No murmurs. LUNGS: Respirations unlabored. CTAB. Good air exchange. Speaking comfortably in full sentences. ABDOMEN: Soft. Non-distended. Non-tender. No guarding or rebound. EXTREMITIES: No peripheral edema. Moves all extremities equally. All extremities neurovascularly intact. SKIN: Warm and dry. No acute rashes. NEUROLOGICAL: Alert and oriented. CN's 2-12 intact. No gross facial drooping. Strength 5/5, sensation intact. Negative finger-to-nose. Negative heel-to-shin. No pronator drift. Negative test of skew. Negative Romberg's. PSYCHIATRIC: Normal mood and affect. RADIOLOGY  CT HEAD WO CONTRAST    Result Date: 7/24/2022  EXAMINATION: CT OF THE HEAD WITHOUT CONTRAST  7/24/2022 12:59 pm TECHNIQUE: CT of the head was performed without the administration of intravenous contrast. Automated exposure control, iterative reconstruction, and/or weight based adjustment of the mA/kV was utilized to reduce the radiation dose to as low as reasonably achievable. COMPARISON: 10/05/2021 HISTORY: ORDERING SYSTEM PROVIDED HISTORY: ha, vision changes TECHNOLOGIST PROVIDED HISTORY: Reason for exam:->ha, vision changes Has a \"code stroke\" or \"stroke alert\" been called? ->No Decision Support Exception - unselect if not a suspected or confirmed emergency medical condition->Emergency Medical Condition (MA) Is the patient pregnant?->No Reason for Exam: blurry vision x 4 days,broken blood vessel in left eye,headache ,dizzzy Additional signs and symptoms: pt said she feels like her head is huge FINDINGS: BRAIN/VENTRICLES: There is no acute intracranial hemorrhage, mass effect or midline shift. No abnormal extra-axial fluid collection. The gray-white differentiation is maintained without evidence of an acute infarct. There is no evidence of hydrocephalus. ORBITS: The visualized portion of the orbits demonstrate no acute abnormality. SINUSES: The visualized paranasal sinuses and mastoid air cells demonstrate no acute abnormality. SOFT TISSUES/SKULL:  No acute abnormality of the visualized skull or soft tissues. No acute intracranial abnormality. XR CHEST PORTABLE    Result Date: 7/24/2022  EXAMINATION: ONE XRAY VIEW OF THE CHEST 7/24/2022 11:06 am COMPARISON: 02/14/2022 HISTORY: ORDERING SYSTEM PROVIDED HISTORY: sob TECHNOLOGIST PROVIDED HISTORY: Reason for exam:->sob Reason for Exam: SOB FINDINGS: The lungs are without acute focal process. There is no effusion or pneumothorax. The cardiomediastinal silhouette is stable. The osseous structures are stable. No acute process. ED COURSE  Pain control was not required while here in the emergency department. Triage vitals stable. CBC without leukocytosis or anemia. CMP unremarkable. Troponin less than 0.01. Head CT without evidence for acute pathology. Stable portable chest x-ray. EKG was a normal sinus rhythm with a sinus arrhythmia. No evidence for acute ischemic changes. Patient without acute pathology at this time. Her visual acuities were recorded at 20/40 in the left eye, 20/40 in the right, and 20/40 bilaterally. Intraocular pressures were elevated in the right eye at 33. Normal and left at 19. Patient will be discharged with Timoptic.   Discussed recommendations for close outpatient PCP and ophthalmology follow-up. States that she is leaving to go on a cruise tomorrow and will follow-up upon her return. Have discussed recommendations for emergent medical evaluation otherwise and patient is in agreement and comfortable at discharge. A discussion was had with Ms. Washington Ramires regarding headache and vision changes, ED findings and recommendations for follow-up. Risk management discussed and shared decision making had with patient and/or surrogate. All questions were answered. Patient will follow up with PCP and ophthalmology as soon as possible for further evaluation/treatment. All questions answered. Patient will return to ED for new/worsening symptoms. Patient was sent home with a prescription for Timoptic.     MDM  Results for orders placed or performed during the hospital encounter of 07/24/22   CBC with Auto Differential   Result Value Ref Range    WBC 5.5 4.0 - 11.0 K/uL    RBC 4.30 4.00 - 5.20 M/uL    Hemoglobin 13.7 12.0 - 16.0 g/dL    Hematocrit 40.7 36.0 - 48.0 %    MCV 94.5 80.0 - 100.0 fL    MCH 31.9 26.0 - 34.0 pg    MCHC 33.7 31.0 - 36.0 g/dL    RDW 12.8 12.4 - 15.4 %    Platelets 596 470 - 902 K/uL    MPV 8.5 5.0 - 10.5 fL    Neutrophils % 75.8 %    Lymphocytes % 18.8 %    Monocytes % 4.3 %    Eosinophils % 0.6 %    Basophils % 0.5 %    Neutrophils Absolute 4.1 1.7 - 7.7 K/uL    Lymphocytes Absolute 1.0 1.0 - 5.1 K/uL    Monocytes Absolute 0.2 0.0 - 1.3 K/uL    Eosinophils Absolute 0.0 0.0 - 0.6 K/uL    Basophils Absolute 0.0 0.0 - 0.2 K/uL   Comprehensive Metabolic Panel   Result Value Ref Range    Sodium 139 136 - 145 mmol/L    Potassium 4.2 3.5 - 5.1 mmol/L    Chloride 103 99 - 110 mmol/L    CO2 26 21 - 32 mmol/L    Anion Gap 10 3 - 16    Glucose 101 (H) 70 - 99 mg/dL    BUN 15 7 - 20 mg/dL    Creatinine 0.6 0.6 - 1.1 mg/dL    GFR Non-African American >60 >60    GFR African American >60 >60    Calcium 9.9 8.3 - 10.6 mg/dL    Total Protein 7.2 6.4 - 8.2 g/dL    Albumin 4.7 3.4 - 5.0 g/dL    Albumin/Globulin Ratio 1.9 1.1 - 2.2    Total Bilirubin 0.3 0.0 - 1.0 mg/dL    Alkaline Phosphatase 74 40 - 129 U/L    ALT 14 10 - 40 U/L    AST 23 15 - 37 U/L   Troponin   Result Value Ref Range    Troponin <0.01 <0.01 ng/mL   EKG 12 Lead   Result Value Ref Range    Ventricular Rate 68 BPM    Atrial Rate 68 BPM    P-R Interval 164 ms    QRS Duration 82 ms    Q-T Interval 398 ms    QTc Calculation (Bazett) 423 ms    P Axis 25 degrees    R Axis 33 degrees    T Axis 35 degrees    Diagnosis       Normal sinus rhythm with sinus arrhythmiaCannot rule out Anterior infarct (cited on or before 10-MAR-2019)Abnormal ECGWhen compared with ECG of 14-FEB-2022 10:53,No significant change was foundConfirmed by Candace Elias MD, Britni Metzger ([id number]) on 7/24/2022 3:36:30 PM     I estimate there is LOW risk for SUBARACHNOID HEMORRHAGE, MENINGITIS, INTRACRANIAL HEMORRHAGE, SUBDURAL HEMATOMA, OR STROKE, thus I consider the discharge disposition reasonable. Leno Salcedo and I have discussed the diagnosis and risks, and we agree with discharging home to follow-up with their primary doctor. We also discussed returning to the Emergency Department immediately if new or worsening symptoms occur. We have discussed the symptoms which are most concerning (e.g., changing or worsening pain, weakness, vomiting, fever) that necessitate immediate return. Final Impression  1. Nonintractable headache, unspecified chronicity pattern, unspecified headache type    2. Blurry vision    3. Subconjunctival hemorrhage of left eye    4. Elevated IOP, right      Discharge Vital Signs:  Blood pressure 118/77, pulse 81, temperature 97.8 °F (36.6 °C), temperature source Oral, resp. rate 16, height 5' (1.524 m), weight 145 lb (65.8 kg), SpO2 100 %. DISPOSITION  Patient was discharged to home in good condition.           Bebeto Vu [place]  07/25/22 1123

## 2022-11-28 ENCOUNTER — TRANSCRIBE ORDERS (OUTPATIENT)
Dept: ADMINISTRATIVE | Age: 52
End: 2022-11-28

## 2022-11-28 DIAGNOSIS — R07.89 CHEST DISCOMFORT: Primary | ICD-10-CM

## 2022-12-05 ENCOUNTER — APPOINTMENT (OUTPATIENT)
Dept: CT IMAGING | Age: 52
End: 2022-12-05
Payer: COMMERCIAL

## 2022-12-05 ENCOUNTER — APPOINTMENT (OUTPATIENT)
Dept: GENERAL RADIOLOGY | Age: 52
End: 2022-12-05
Payer: COMMERCIAL

## 2022-12-05 ENCOUNTER — HOSPITAL ENCOUNTER (OUTPATIENT)
Age: 52
Setting detail: OBSERVATION
Discharge: HOME | End: 2022-12-07
Attending: EMERGENCY MEDICINE | Admitting: INTERNAL MEDICINE
Payer: COMMERCIAL

## 2022-12-05 DIAGNOSIS — R07.9 CHEST PAIN, UNSPECIFIED TYPE: Primary | ICD-10-CM

## 2022-12-05 DIAGNOSIS — R42 DIZZINESS: ICD-10-CM

## 2022-12-05 LAB
A/G RATIO: 2 (ref 1.1–2.2)
ALBUMIN SERPL-MCNC: 4.8 G/DL (ref 3.4–5)
ALP BLD-CCNC: 64 U/L (ref 40–129)
ALT SERPL-CCNC: 20 U/L (ref 10–40)
ANION GAP SERPL CALCULATED.3IONS-SCNC: 12 MMOL/L (ref 3–16)
AST SERPL-CCNC: 21 U/L (ref 15–37)
BASOPHILS ABSOLUTE: 0 K/UL (ref 0–0.2)
BASOPHILS RELATIVE PERCENT: 0.8 %
BILIRUB SERPL-MCNC: 0.4 MG/DL (ref 0–1)
BUN BLDV-MCNC: 14 MG/DL (ref 7–20)
CALCIUM SERPL-MCNC: 9.6 MG/DL (ref 8.3–10.6)
CHLORIDE BLD-SCNC: 103 MMOL/L (ref 99–110)
CO2: 25 MMOL/L (ref 21–32)
CREAT SERPL-MCNC: 0.7 MG/DL (ref 0.6–1.1)
EKG ATRIAL RATE: 87 BPM
EKG DIAGNOSIS: NORMAL
EKG P AXIS: 27 DEGREES
EKG P-R INTERVAL: 156 MS
EKG Q-T INTERVAL: 364 MS
EKG QRS DURATION: 78 MS
EKG QTC CALCULATION (BAZETT): 438 MS
EKG R AXIS: 31 DEGREES
EKG T AXIS: 38 DEGREES
EKG VENTRICULAR RATE: 87 BPM
EOSINOPHILS ABSOLUTE: 0.1 K/UL (ref 0–0.6)
EOSINOPHILS RELATIVE PERCENT: 1.4 %
GFR SERPL CREATININE-BSD FRML MDRD: >60 ML/MIN/{1.73_M2}
GLUCOSE BLD-MCNC: 119 MG/DL (ref 70–99)
HCT VFR BLD CALC: 43.1 % (ref 36–48)
HEMOGLOBIN: 14.6 G/DL (ref 12–16)
LYMPHOCYTES ABSOLUTE: 1.8 K/UL (ref 1–5.1)
LYMPHOCYTES RELATIVE PERCENT: 35.8 %
MCH RBC QN AUTO: 32.3 PG (ref 26–34)
MCHC RBC AUTO-ENTMCNC: 33.8 G/DL (ref 31–36)
MCV RBC AUTO: 95.6 FL (ref 80–100)
MONOCYTES ABSOLUTE: 0.3 K/UL (ref 0–1.3)
MONOCYTES RELATIVE PERCENT: 5.4 %
NEUTROPHILS ABSOLUTE: 2.8 K/UL (ref 1.7–7.7)
NEUTROPHILS RELATIVE PERCENT: 56.6 %
PDW BLD-RTO: 12.8 % (ref 12.4–15.4)
PLATELET # BLD: 257 K/UL (ref 135–450)
PMV BLD AUTO: 8.3 FL (ref 5–10.5)
POTASSIUM REFLEX MAGNESIUM: 3.7 MMOL/L (ref 3.5–5.1)
RBC # BLD: 4.51 M/UL (ref 4–5.2)
SODIUM BLD-SCNC: 140 MMOL/L (ref 136–145)
TOTAL PROTEIN: 7.2 G/DL (ref 6.4–8.2)
TROPONIN: <0.01 NG/ML
WBC # BLD: 4.9 K/UL (ref 4–11)

## 2022-12-05 PROCEDURE — 85025 COMPLETE CBC W/AUTO DIFF WBC: CPT

## 2022-12-05 PROCEDURE — 70450 CT HEAD/BRAIN W/O DYE: CPT

## 2022-12-05 PROCEDURE — 70496 CT ANGIOGRAPHY HEAD: CPT

## 2022-12-05 PROCEDURE — 71046 X-RAY EXAM CHEST 2 VIEWS: CPT

## 2022-12-05 PROCEDURE — 93010 ELECTROCARDIOGRAM REPORT: CPT | Performed by: INTERNAL MEDICINE

## 2022-12-05 PROCEDURE — 6370000000 HC RX 637 (ALT 250 FOR IP): Performed by: PHYSICIAN ASSISTANT

## 2022-12-05 PROCEDURE — 6370000000 HC RX 637 (ALT 250 FOR IP): Performed by: INTERNAL MEDICINE

## 2022-12-05 PROCEDURE — 93005 ELECTROCARDIOGRAM TRACING: CPT | Performed by: EMERGENCY MEDICINE

## 2022-12-05 PROCEDURE — 80053 COMPREHEN METABOLIC PANEL: CPT

## 2022-12-05 PROCEDURE — G0378 HOSPITAL OBSERVATION PER HR: HCPCS

## 2022-12-05 PROCEDURE — 6360000004 HC RX CONTRAST MEDICATION: Performed by: PHYSICIAN ASSISTANT

## 2022-12-05 PROCEDURE — 99285 EMERGENCY DEPT VISIT HI MDM: CPT

## 2022-12-05 PROCEDURE — 84484 ASSAY OF TROPONIN QUANT: CPT

## 2022-12-05 RX ORDER — ATORVASTATIN CALCIUM 40 MG/1
40 TABLET, FILM COATED ORAL NIGHTLY
Status: DISCONTINUED | OUTPATIENT
Start: 2022-12-05 | End: 2022-12-07 | Stop reason: HOSPADM

## 2022-12-05 RX ORDER — ACETAMINOPHEN 325 MG/1
650 TABLET ORAL EVERY 6 HOURS PRN
Status: DISCONTINUED | OUTPATIENT
Start: 2022-12-05 | End: 2022-12-07 | Stop reason: HOSPADM

## 2022-12-05 RX ORDER — GLIMEPIRIDE 2 MG/1
1 TABLET ORAL 2 TIMES DAILY
Status: DISCONTINUED | OUTPATIENT
Start: 2022-12-05 | End: 2022-12-07 | Stop reason: HOSPADM

## 2022-12-05 RX ORDER — ENOXAPARIN SODIUM 100 MG/ML
40 INJECTION SUBCUTANEOUS DAILY
Status: DISCONTINUED | OUTPATIENT
Start: 2022-12-05 | End: 2022-12-07 | Stop reason: HOSPADM

## 2022-12-05 RX ORDER — VILAZODONE HYDROCHLORIDE 20 MG/1
10 TABLET ORAL DAILY
Status: DISCONTINUED | OUTPATIENT
Start: 2022-12-05 | End: 2022-12-05

## 2022-12-05 RX ORDER — ASPIRIN 325 MG
325 TABLET ORAL ONCE
Status: COMPLETED | OUTPATIENT
Start: 2022-12-05 | End: 2022-12-05

## 2022-12-05 RX ORDER — ACETAMINOPHEN, ASPIRIN AND CAFFEINE 250; 250; 65 MG/1; MG/1; MG/1
1 TABLET, FILM COATED ORAL EVERY 6 HOURS PRN
Status: DISCONTINUED | OUTPATIENT
Start: 2022-12-05 | End: 2022-12-07 | Stop reason: HOSPADM

## 2022-12-05 RX ORDER — ASPIRIN 81 MG/1
81 TABLET, CHEWABLE ORAL DAILY
Status: DISCONTINUED | OUTPATIENT
Start: 2022-12-06 | End: 2022-12-07 | Stop reason: HOSPADM

## 2022-12-05 RX ORDER — SODIUM CHLORIDE 9 MG/ML
INJECTION, SOLUTION INTRAVENOUS PRN
Status: DISCONTINUED | OUTPATIENT
Start: 2022-12-05 | End: 2022-12-07 | Stop reason: HOSPADM

## 2022-12-05 RX ORDER — ONDANSETRON 2 MG/ML
4 INJECTION INTRAMUSCULAR; INTRAVENOUS EVERY 6 HOURS PRN
Status: DISCONTINUED | OUTPATIENT
Start: 2022-12-05 | End: 2022-12-07 | Stop reason: HOSPADM

## 2022-12-05 RX ORDER — SODIUM CHLORIDE 0.9 % (FLUSH) 0.9 %
5-40 SYRINGE (ML) INJECTION EVERY 12 HOURS SCHEDULED
Status: DISCONTINUED | OUTPATIENT
Start: 2022-12-05 | End: 2022-12-07 | Stop reason: HOSPADM

## 2022-12-05 RX ORDER — ONDANSETRON 4 MG/1
4 TABLET, ORALLY DISINTEGRATING ORAL EVERY 8 HOURS PRN
Status: DISCONTINUED | OUTPATIENT
Start: 2022-12-05 | End: 2022-12-07 | Stop reason: HOSPADM

## 2022-12-05 RX ORDER — SODIUM CHLORIDE 0.9 % (FLUSH) 0.9 %
5-40 SYRINGE (ML) INJECTION PRN
Status: DISCONTINUED | OUTPATIENT
Start: 2022-12-05 | End: 2022-12-07 | Stop reason: HOSPADM

## 2022-12-05 RX ORDER — POLYETHYLENE GLYCOL 3350 17 G/17G
17 POWDER, FOR SOLUTION ORAL DAILY PRN
Status: DISCONTINUED | OUTPATIENT
Start: 2022-12-05 | End: 2022-12-07 | Stop reason: HOSPADM

## 2022-12-05 RX ORDER — ROSUVASTATIN CALCIUM 5 MG/1
5 TABLET, COATED ORAL DAILY
Status: ON HOLD | COMMUNITY
End: 2022-12-07 | Stop reason: HOSPADM

## 2022-12-05 RX ORDER — ACETAMINOPHEN 650 MG/1
650 SUPPOSITORY RECTAL EVERY 6 HOURS PRN
Status: DISCONTINUED | OUTPATIENT
Start: 2022-12-05 | End: 2022-12-07 | Stop reason: HOSPADM

## 2022-12-05 RX ADMIN — ACETAMINOPHEN 650 MG: 325 TABLET, FILM COATED ORAL at 18:45

## 2022-12-05 RX ADMIN — ASPIRIN 325 MG: 325 TABLET ORAL at 13:53

## 2022-12-05 RX ADMIN — TIMOLOL MALEATE 1 DROP: 2.5 SOLUTION OPHTHALMIC at 20:03

## 2022-12-05 RX ADMIN — IOPAMIDOL 75 ML: 755 INJECTION, SOLUTION INTRAVENOUS at 12:37

## 2022-12-05 ASSESSMENT — PAIN - FUNCTIONAL ASSESSMENT
PAIN_FUNCTIONAL_ASSESSMENT: NONE - DENIES PAIN
PAIN_FUNCTIONAL_ASSESSMENT: 0-10

## 2022-12-05 ASSESSMENT — PAIN DESCRIPTION - LOCATION
LOCATION: HEAD
LOCATION: CHEST

## 2022-12-05 ASSESSMENT — PAIN SCALES - GENERAL
PAINLEVEL_OUTOF10: 4
PAINLEVEL_OUTOF10: 6

## 2022-12-05 NOTE — H&P
Hospital Medicine History & Physical      PCP: Guilherme Joyce MD    Date of Admission: 12/5/2022    Date of Service: Pt seen/examined on above date and Admitted to Placed in Observation. Chief Complaint: Chest pain, dizziness      History Of Present Illness:    47 y.o. female with history of irritable bowel syndrome, acid reflux, anxiety who presented to [facility] with chest pain and dizziness. Patient reports she has been having intermittent substernal chest pain x3 weeks worse with activity and relieved with rest.  She had a stress test in April 2022 which was normal.  She had seen her cardiologist at [facility] last week and had her scheduled for an outpatient cardiac CT later this month. Patient reports her chest pain worsened today with radiation to her neck and left arm which progressively worsened and she had dizziness. She reports transient numbness in her bilateral fifth fingers with blurry vision when she was dizzy with neck pain. She denies any LOC,  focal weakness, dysphagia, facial droop, recent head trauma, recent long distance travel, history of blood clot, malignancy or immobility. She decided to come to the ER for eval.  CT head and CTA head and neck in the ER were unremarkable. Initial troponin was negative and EKG was nonacute.   Admission was requested for further care          Past Medical History:          Diagnosis Date    Acid reflux     Anxiety     IBS (irritable bowel syndrome)     PONV (postoperative nausea and vomiting)     Prolonged emergence from general anesthesia     Salmonella 06/10/2018    + gi cx salmonella species    Shoulder injury     chronic pain r/t shoulder injury    Unspecified cerebral artery occlusion with cerebral infarction     mild stroke after taking birth control       Past Surgical History:          Procedure Laterality Date    COLONOSCOPY      COLONOSCOPY  6/29/15    normal    ENDOSCOPY, COLON, DIAGNOSTIC      HYSTERECTOMY (CERVIX STATUS UNKNOWN) SHOULDER SURGERY Left     UPPER GASTROINTESTINAL ENDOSCOPY  6/29/15    normal       Medications Prior to Admission:      Prior to Admission medications    Medication Sig Start Date End Date Taking? Authorizing Provider   timolol (TIMOPTIC) 0.25 % ophthalmic solution Place 1 drop into the right eye in the morning and 1 drop before bedtime. 7/24/22   FLACO Gupta   vilazodone HCl (VIIBRYD) 10 MG TABS Take 10 mg by mouth daily 1/22/21   Historical Provider, MD   aspirin-acetaminophen-caffeine (EXCEDRIN EXTRA STRENGTH) 443-254-32 MG per tablet Take 1 tablet by mouth every 6 hours as needed for Headaches 10/5/21   Drea Quick, APRN - CNP   metoprolol tartrate (LOPRESSOR) 25 MG tablet Take 25 mg by mouth daily    Historical Provider, MD   ondansetron (ZOFRAN) 4 MG tablet Take 1 tablet by mouth every 8 hours as needed for Nausea 3/16/20   FLACO Petersen   LORazepam (ATIVAN) 0.5 MG tablet Take 0.5 mg by mouth as needed for Anxiety    Historical Provider, MD       Allergies:  Prednisolone, Sudafed [pseudoephedrine hcl], Sulfa antibiotics, and Tape [adhesive tape]    Social History:      The patient currently lives at home    TOBACCO:   reports that she has never smoked. She has never used smokeless tobacco.  ETOH:   reports current alcohol use of about 2.0 - 3.0 standard drinks per week. E-cigarette/Vaping       Questions Responses    E-cigarette/Vaping Use Never User    Start Date     Passive Exposure     Quit Date     Counseling Given     Comments               Family History:              Problem Relation Age of Onset    Cancer Mother     High Blood Pressure Father     Other Father        REVIEW OF SYSTEMS COMPLETED:   Pertinent positives as noted in the HPI. All other systems reviewed and negative.     PHYSICAL EXAM PERFORMED:    /86   Pulse 74   Temp 98.6 °F (37 °C)   Resp 18   Ht 5' (1.524 m)   Wt 145 lb (65.8 kg)   SpO2 97%   BMI 28.32 kg/m²     General appearance:  No apparent distress, appears stated age and cooperative. HEENT:  Normal cephalic, atraumatic without obvious deformity. Pupils equal, round, and reactive to light. Extra ocular muscles intact. Conjunctivae/corneas clear. Neck: Supple, with full range of motion. No jugular venous distention. Trachea midline. Respiratory:  Normal respiratory effort. Clear to auscultation, bilaterally without Rales/Wheezes/Rhonchi. Cardiovascular:  Regular rate and rhythm with normal S1/S2 without murmurs, rubs or gallops. Abdomen: Soft, non-tender, non-distended with normal bowel sounds. Musculoskeletal:  No clubbing, cyanosis or edema bilaterally. Skin: Warm and dry  Neurologic:  Neurovascularly intact without any focal sensory/motor deficits. Cranial nerves: II-XII intact, grossly non-focal.  Psychiatric:  Alert and oriented, thought content appropriate, normal insight        Labs:     Recent Labs     12/05/22  1148   WBC 4.9   HGB 14.6   HCT 43.1        Recent Labs     12/05/22  1148      K 3.7      CO2 25   BUN 14   CREATININE 0.7   CALCIUM 9.6     Recent Labs     12/05/22  1148   AST 21   ALT 20   BILITOT 0.4   ALKPHOS 64     No results for input(s): INR in the last 72 hours. Recent Labs     12/05/22  1148   TROPONINI <0.01       Urinalysis:      Lab Results   Component Value Date/Time    NITRU Negative 06/10/2018 05:40 PM    WBCUA 0-2 06/10/2018 05:40 PM    BACTERIA Rare 06/10/2018 05:40 PM    RBCUA 0-2 06/10/2018 05:40 PM    BLOODU MODERATE 06/10/2018 05:40 PM    SPECGRAV 1.025 06/10/2018 05:40 PM    GLUCOSEU Negative 06/10/2018 05:40 PM       Radiology:       EKG:  I have reviewed the EKG with the following interpretation: Normal sinus rhythm with no acute ST-T changes. CTA HEAD NECK W CONTRAST   Final Result   Unremarkable CTA of the head and neck. CT HEAD WO CONTRAST   Final Result   No acute intracranial abnormality. XR CHEST (2 VW)   Final Result   No acute process.            Stress echo on 4/8/2022     Interpetation Summary:        o Negative ECG for ischemia with graded exercise test.      o Duke Treadmill Score is 5 which indicates low risk. o Negative stress echo with no indication of inducible ischemia. Consults:    IP CONSULT TO CARDIOLOGY    ASSESSMENT AND PLAN     Active Hospital Problems    Diagnosis Date Noted    Chest pain [R07.9] 12/05/2022     Priority: Medium       Chest pain: exertional, concerning for possible ACS. Initial troponin negative. EKG nonacute. Had normal stress echo  in April 2022. Had recently seen her outpatient cardiologist at [address] [place] [address] with plans for cardiac CT. Admit on telemetry. Serial troponin. Consult cardio for recs concerning ischemic eval needed- ?cardiac CT vrs repeat stress test. Keep NPO at [place] for now. Start aspirin and statin      Dizziness with transient numbness in fingers:  ? TIA  vrs acute CVA as cause. CT head and CT head and neck were unremarkable. Hx of TIA as a teenager per pt. Check MRI brain , cardiac echo. Consult neuro if MRI abnormal. Check orthostats, serial neuro checks, FLP. Aspirin, statin. Anxiety: Mood stable. continue ativan PRN. No longer on vilazodone. Hx of Occular HTN : per pt. On timolol eye drops- continue     DVT Prophylaxis: lovenox           Diet: ADULT DIET; Regular; No Caffeine  Code Status: Full Code    PT/OT Eval Status: ordered     Dispo - 1-2 days pending clinical course and completion of work up        Will Ritter MD    Thank you Monalisa Almanza MD for the opportunity to be involved in this patient's care. If you have any questions or concerns please feel free to contact me at [phone].

## 2022-12-06 ENCOUNTER — APPOINTMENT (OUTPATIENT)
Dept: MRI IMAGING | Age: 52
End: 2022-12-06
Payer: COMMERCIAL

## 2022-12-06 ENCOUNTER — APPOINTMENT (OUTPATIENT)
Dept: CT IMAGING | Age: 52
End: 2022-12-06
Payer: COMMERCIAL

## 2022-12-06 PROBLEM — E78.5 HYPERLIPIDEMIA: Status: ACTIVE | Noted: 2022-12-06

## 2022-12-06 LAB
CHOLESTEROL, TOTAL: 178 MG/DL (ref 0–199)
HCT VFR BLD CALC: 41.6 % (ref 36–48)
HDLC SERPL-MCNC: 67 MG/DL (ref 40–60)
HEMOGLOBIN: 13.9 G/DL (ref 12–16)
LDL CHOLESTEROL CALCULATED: 95 MG/DL
MCH RBC QN AUTO: 32.6 PG (ref 26–34)
MCHC RBC AUTO-ENTMCNC: 33.6 G/DL (ref 31–36)
MCV RBC AUTO: 97.2 FL (ref 80–100)
PDW BLD-RTO: 12.5 % (ref 12.4–15.4)
PLATELET # BLD: 242 K/UL (ref 135–450)
PMV BLD AUTO: 8.7 FL (ref 5–10.5)
RBC # BLD: 4.28 M/UL (ref 4–5.2)
TRIGL SERPL-MCNC: 81 MG/DL (ref 0–150)
TROPONIN: <0.01 NG/ML
VLDLC SERPL CALC-MCNC: 16 MG/DL
WBC # BLD: 3.7 K/UL (ref 4–11)

## 2022-12-06 PROCEDURE — G0378 HOSPITAL OBSERVATION PER HR: HCPCS

## 2022-12-06 PROCEDURE — 6360000004 HC RX CONTRAST MEDICATION: Performed by: INTERNAL MEDICINE

## 2022-12-06 PROCEDURE — 70551 MRI BRAIN STEM W/O DYE: CPT

## 2022-12-06 PROCEDURE — 97530 THERAPEUTIC ACTIVITIES: CPT

## 2022-12-06 PROCEDURE — 80061 LIPID PANEL: CPT

## 2022-12-06 PROCEDURE — 75574 CT ANGIO HRT W/3D IMAGE: CPT

## 2022-12-06 PROCEDURE — 97165 OT EVAL LOW COMPLEX 30 MIN: CPT

## 2022-12-06 PROCEDURE — 97116 GAIT TRAINING THERAPY: CPT

## 2022-12-06 PROCEDURE — 93306 TTE W/DOPPLER COMPLETE: CPT

## 2022-12-06 PROCEDURE — 6370000000 HC RX 637 (ALT 250 FOR IP): Performed by: INTERNAL MEDICINE

## 2022-12-06 PROCEDURE — 36415 COLL VENOUS BLD VENIPUNCTURE: CPT

## 2022-12-06 PROCEDURE — 99205 OFFICE O/P NEW HI 60 MIN: CPT | Performed by: INTERNAL MEDICINE

## 2022-12-06 PROCEDURE — 97162 PT EVAL MOD COMPLEX 30 MIN: CPT

## 2022-12-06 PROCEDURE — 2580000003 HC RX 258: Performed by: INTERNAL MEDICINE

## 2022-12-06 PROCEDURE — 85027 COMPLETE CBC AUTOMATED: CPT

## 2022-12-06 PROCEDURE — 84484 ASSAY OF TROPONIN QUANT: CPT

## 2022-12-06 RX ORDER — METOPROLOL TARTRATE 50 MG/1
50 TABLET, FILM COATED ORAL ONCE
Status: COMPLETED | OUTPATIENT
Start: 2022-12-06 | End: 2022-12-06

## 2022-12-06 RX ADMIN — METOPROLOL TARTRATE 50 MG: 50 TABLET ORAL at 14:41

## 2022-12-06 RX ADMIN — ATORVASTATIN CALCIUM 40 MG: 40 TABLET, FILM COATED ORAL at 21:39

## 2022-12-06 RX ADMIN — TIMOLOL MALEATE 1 DROP: 2.5 SOLUTION OPHTHALMIC at 09:43

## 2022-12-06 RX ADMIN — SODIUM CHLORIDE, PRESERVATIVE FREE 10 ML: 5 INJECTION INTRAVENOUS at 10:00

## 2022-12-06 RX ADMIN — ASPIRIN 81 MG: 81 TABLET, CHEWABLE ORAL at 09:47

## 2022-12-06 RX ADMIN — SODIUM CHLORIDE, PRESERVATIVE FREE 10 ML: 5 INJECTION INTRAVENOUS at 21:42

## 2022-12-06 RX ADMIN — ACETAMINOPHEN 650 MG: 325 TABLET, FILM COATED ORAL at 18:34

## 2022-12-06 RX ADMIN — TIMOLOL MALEATE 1 DROP: 2.5 SOLUTION OPHTHALMIC at 21:41

## 2022-12-06 RX ADMIN — IOPAMIDOL 100 ML: 755 INJECTION, SOLUTION INTRAVENOUS at 17:24

## 2022-12-06 RX ADMIN — ACETAMINOPHEN 650 MG: 325 TABLET, FILM COATED ORAL at 06:58

## 2022-12-06 ASSESSMENT — PAIN DESCRIPTION - LOCATION
LOCATION: HEAD

## 2022-12-06 ASSESSMENT — PAIN DESCRIPTION - DESCRIPTORS
DESCRIPTORS: ACHING
DESCRIPTORS: DISCOMFORT;STABBING
DESCRIPTORS: ACHING
DESCRIPTORS: DISCOMFORT

## 2022-12-06 ASSESSMENT — PAIN SCALES - GENERAL
PAINLEVEL_OUTOF10: 4
PAINLEVEL_OUTOF10: 6

## 2022-12-06 ASSESSMENT — PAIN DESCRIPTION - ORIENTATION
ORIENTATION: RIGHT

## 2022-12-06 NOTE — CONSULTS
[facility]  [phone]      Attending Physician: Will Ritter MD  Reason for Consultation/Chief Complaint: CP    Subjective   History of Present Illness:  Iraj Mccord is a 47 y.o. patient who presented to the hospital with complaints of chest pain. She states has had this for last 3-1/2 weeks. She recently has seen a cardiologist at [address] [place] [address] and was ordering a cardiac calcium scan. She states this pain starts dead center in her mid sternum and is somewhat sharp and then radiates up to her left neck and jaw and a little bit to her left shoulder. It is associated with some heaviness as well and does not occur necessarily with exertion. She states it has felt a little bit worse with breathing or if she lays on her chest.  When she lays on her chest she especially feels some extra palpitations. She states other day got significantly worse and she felt like she was very lightheaded so she came to the emergency room. She does not feel it significantly changes with exertion    Past Medical History:   has a past medical history of Acid reflux, Anxiety, IBS (irritable bowel syndrome), PONV (postoperative nausea and vomiting), Prolonged emergence from general anesthesia, Salmonella, Shoulder injury, and Unspecified cerebral artery occlusion with cerebral infarction. Surgical History:   has a past surgical history that includes shoulder surgery (Left); Colonoscopy; Endoscopy, colon, diagnostic; Hysterectomy; Upper gastrointestinal endoscopy (6/29/15); and Colonoscopy (6/29/15). Social History:   reports that she has never smoked. She has never used smokeless tobacco. She reports current alcohol use of about 2.0 - 3.0 standard drinks per week. She reports that she does not use drugs. Family History:  family history includes Cancer in her mother; High Blood Pressure in her father; Other in her father.       Home Medications:  Were reviewed and are listed in nursing record and/or below  Prior to Admission medications    Medication Sig Start Date End Date Taking? Authorizing Provider   rosuvastatin (CRESTOR) 5 MG tablet Take 5 mg by mouth daily   Yes Historical Provider, MD   timolol (TIMOPTIC) 0.25 % ophthalmic solution Place 1 drop into the right eye in the morning and 1 drop before bedtime. 7/24/22   Dennie Part, PA   aspirin-acetaminophen-caffeine (EXCEDRIN EXTRA STRENGTH) 285-479-06 MG per tablet Take 1 tablet by mouth every 6 hours as needed for Headaches 10/5/21   Christina House APRN - CNP   metoprolol tartrate (LOPRESSOR) 25 MG tablet Take 25 mg by mouth daily    Historical Provider, MD   ondansetron (ZOFRAN) 4 MG tablet Take 1 tablet by mouth every 8 hours as needed for Nausea 3/16/20   FLACO Gu   LORazepam (ATIVAN) 0.5 MG tablet Take 0.5 mg by mouth as needed for Anxiety    Historical Provider, MD        CURRENT Medications:  aspirin-acetaminophen-caffeine (EXCEDRIN MIGRAINE) per tablet 1 tablet, Q6H PRN  sodium chloride flush 0.9 % injection 5-40 mL, 2 times per day  sodium chloride flush 0.9 % injection 5-40 mL, PRN  0.9 % sodium chloride infusion, PRN  ondansetron (ZOFRAN-ODT) disintegrating tablet 4 mg, Q8H PRN   Or  ondansetron (ZOFRAN) injection 4 mg, Q6H PRN  acetaminophen (TYLENOL) tablet 650 mg, Q6H PRN   Or  acetaminophen (TYLENOL) suppository 650 mg, Q6H PRN  polyethylene glycol (GLYCOLAX) packet 17 g, Daily PRN  aspirin chewable tablet 81 mg, Daily  atorvastatin (LIPITOR) tablet 40 mg, Nightly  enoxaparin (LOVENOX) injection 40 mg, Daily  perflutren lipid microspheres (DEFINITY) injection 1.5 mL, ONCE PRN  timolol (TIMOPTIC) 0.25 % ophthalmic solution 1 drop, BID        Allergies:  Prednisolone, Sudafed [pseudoephedrine hcl], Sulfa antibiotics, and Tape [adhesive tape]     Review of Systems:   A 14 point review of symptoms completed.  Pertinent positives identified in the HPI, all other review of symptoms negative as below.      Objective   PHYSICAL EXAM:    Vitals:    12/06/22 1019   BP: 128/83   Pulse: 83   Resp:    Temp:    SpO2: 96%    Weight: 145 lb 1.6 oz (65.8 kg)    Vitals:    12/05/22 1758 12/06/22 0520 12/06/22 0800 12/06/22 1019   BP:  122/80 128/84 128/83   Pulse:  72 83 83   Resp:  18 18    Temp:  98 °F (36.7 °C) 98.3 °F (36.8 °C)    TempSrc:  Oral Oral    SpO2:  94% 96% 96%   Weight: 145 lb 1.6 oz (65.8 kg)      Height: 5' (1.524 m)             General Appearance:  Alert, cooperative, no distress, appears stated age   Head:  Normocephalic, without obvious abnormality, atraumatic   Eyes:  PERRL, conjunctiva/corneas clear   Nose: Nares normal, no drainage or sinus tenderness   Throat: Lips, mucosa, and tongue normal   Neck: Supple, symmetrical, trachea midline, no adenopathy, thyroid: not enlarged, symmetric, no tenderness/mass/nodules, no carotid bruit or JVD   Lungs:   Clear to auscultation bilaterally, respirations unlabored   Chest Wall:  No deformity or tenderness   Heart:  Regular rate and rhythm, S1, S2 normal, no murmur, rub or gallop   Abdomen:   Soft, non-tender, bowel sounds active all four quadrants,  no masses, no organomegaly   Extremities: Extremities normal, atraumatic, no cyanosis or edema   Pulses: 2+ and symmetric   Skin: Skin color, texture, turgor normal, no rashes or lesions   Pysch: Normal mood and affect   Neurologic: Normal gross motor and sensory exam.         Labs   CBC:   Lab Results   Component Value Date/Time    WBC 3.7 12/06/2022 06:36 AM    RBC 4.28 12/06/2022 06:36 AM    HGB 13.9 12/06/2022 06:36 AM    HCT 41.6 12/06/2022 06:36 AM    MCV 97.2 12/06/2022 06:36 AM    RDW 12.5 12/06/2022 06:36 AM     12/06/2022 06:36 AM     CMP:  Lab Results   Component Value Date/Time     12/05/2022 11:48 AM    K 3.7 12/05/2022 11:48 AM     12/05/2022 11:48 AM    CO2 25 12/05/2022 11:48 AM    BUN 14 12/05/2022 11:48 AM    CREATININE 0.7 12/05/2022 11:48 AM    GFRAA >60 07/24/2022 11:28 AM    AGRATIO 2.0 2022 11:48 AM    LABGLOM >60 2022 11:48 AM    GLUCOSE 119 2022 11:48 AM    PROT 7.2 2022 11:48 AM    CALCIUM 9.6 2022 11:48 AM    BILITOT 0.4 2022 11:48 AM    ALKPHOS 64 2022 11:48 AM    AST 21 2022 11:48 AM    ALT 20 2022 11:48 AM     PT/INR:  No results found for: PTINR  HgBA1c:No results found for: LABA1C  Lab Results   Component Value Date    TROPONINI <0.01 2022         Cardiac Data     Last EK2022 NSR with no ischemia, NSST changes. Echo:    Stress Test: 2015   Normal stress echocardiogram study. Cath:    Studies:     CTH and neck: negative  Brain MRI:No acute intracranial abnormality. Assessment and Plan      1. Chest pain: atypical  2 hld      PLAN  1. Pt r/o for AMI   - ok to proceed with Cardiac CTA, BB prior   2. Echo pending  3. SED/CRP  4. She also states that some of her symptoms may have started after she was started on Crestor. If the above testing is negative I have asked her to hold this medicine for 1 full week and see if this improves her symptoms and follow-up with her cardiologist.      Pt followed with [facility]    Patient Active Problem List   Diagnosis    Chest pain           Thank you for allowing us to participate in the care of [address] [place] [address]. Please call me with any questions [phone]. Fabiano Lopez MD, [address] Cardiologist  [address]  [phone] [facility]  [phone] [address] [place] [address]  2022 11:22 AM    I will address the patient's cardiac risk factors and adjusted pharmacologic treatment as needed. In addition, I have reinforced the need for patient directed risk factor modification. All questions and concerns were addressed to the patient/family. Alternatives to my treatment were discussed. The note was completed using EMR.  Every effort was made to ensure accuracy; however, inadvertent computerized transcription errors may be present.

## 2022-12-06 NOTE — PROGRESS NOTES
Hospitalist Progress Note      PCP: Kristine Santana MD    Date of Admission: 12/5/2022    Chief Complaint: Chest pain, dizziness    Hospital Course: H&P reviewed    Subjective:     Patient denies any chest pain today's. Has mild headache, neck pain with no dizziness, tingling or weakness      Medications:  Reviewed    Infusion Medications    sodium chloride       Scheduled Medications    sodium chloride flush  5-40 mL IntraVENous 2 times per day    aspirin  81 mg Oral Daily    atorvastatin  40 mg Oral Nightly    enoxaparin  40 mg SubCUTAneous Daily    timolol  1 drop Both Eyes BID     PRN Meds: aspirin-acetaminophen-caffeine, sodium chloride flush, sodium chloride, ondansetron **OR** ondansetron, acetaminophen **OR** acetaminophen, polyethylene glycol, perflutren lipid microspheres    No intake or output data in the 24 hours ending 12/06/22 1346    Physical Exam Performed:    /83   Pulse 83   Temp 98.3 °F (36.8 °C) (Oral)   Resp 18   Ht 5' (1.524 m)   Wt 145 lb 1.6 oz (65.8 kg)   SpO2 96%   BMI 28.34 kg/m²     General appearance: No apparent distress, appears stated age and cooperative. HEENT: Pupils equal, round, onjunctivae/corneas clear. Neck: Supple, with full range of motion. No jugular venous distention. Trachea midline. Respiratory:  Normal respiratory effort. Clear to auscultation, bilaterally without Rales/Wheezes/Rhonchi. Cardiovascular: Regular rate and rhythm with normal S1/S2 without murmurs, rubs or gallops. Abdomen: Soft, non-tender, non-distended with normal bowel sounds. Musculoskeletal: No clubbing, cyanosis or edema bilaterally. Full range of motion without deformity. Skin: Skin color, texture, turgor normal.  No rashes or lesions. Neurologic:  Neurovascularly intact without any focal sensory/motor deficits.  Cranial nerves: II-XII intact, grossly non-focal.  Psychiatric: Alert and oriented, thought content appropriate, normal insight        Labs:   Recent Labs 12/05/22  1148 12/06/22  0636   WBC 4.9 3.7*   HGB 14.6 13.9   HCT 43.1 41.6    242     Recent Labs     12/05/22  1148      K 3.7      CO2 25   BUN 14   CREATININE 0.7   CALCIUM 9.6     Recent Labs     12/05/22  1148   AST 21   ALT 20   BILITOT 0.4   ALKPHOS 64     No results for input(s): INR in the last 72 hours. Recent Labs     12/05/22  1148 12/06/22  0636   TROPONINI <0.01 <0.01       Urinalysis:      Lab Results   Component Value Date/Time    NITRU Negative 06/10/2018 05:40 PM    WBCUA 0-2 06/10/2018 05:40 PM    BACTERIA Rare 06/10/2018 05:40 PM    RBCUA 0-2 06/10/2018 05:40 PM    BLOODU MODERATE 06/10/2018 05:40 PM    SPECGRAV 1.025 06/10/2018 05:40 PM    GLUCOSEU Negative 06/10/2018 05:40 PM       Radiology:  MRI BRAIN WO CONTRAST   Final Result   No acute intracranial abnormality. RECOMMENDATIONS:   Unavailable         CTA HEAD NECK W CONTRAST   Final Result   Unremarkable CTA of the head and neck. CT HEAD WO CONTRAST   Final Result   No acute intracranial abnormality. XR CHEST (2 VW)   Final Result   No acute process. IP CONSULT TO CARDIOLOGY    Assessment/Plan:    Active Hospital Problems    Diagnosis     Chest pain [R07.9]      Priority: Medium        Chest pain: exertional, concerning for possible ACS. Had normal stress echo  in April 2022. Had recently seen her outpatient cardiologist at [address] [place] [address] with plans for outpatient cardiac CT. Serial troponin neg. Cardiology consulted. Recs appreciated. Cardiac CTA , cardiac echo pending. Dizziness with transient numbness in fingers: Seems resolved. Acute  CVA ruled out with MRI which was nonacute. Orthostats negative. May have peripheral vertigo vrs TIA. PT OT recommended outpatient PT for vestibular therapy. Continue aspirin, statin. Anxiety: Mood stable. continue ativan PRN. No longer on vilazodone. Hx of Occular HTN : per pt.  On timolol eye drops- continue        DVT Prophylaxis: Lovenox  Diet: Diet NPO  Code Status: Full Code  PT/OT Eval Status: Ordered    Dispo -pending completion of cardiac work-up:  cardiac CT, echo pending.       Appropriate for A1 Discharge Unit: Veronica Singh MD

## 2022-12-06 NOTE — PROGRESS NOTES
Occupational Therapy  Facility/Department: [place] [address] [facility]  Occupational Therapy Initial Assessment, Treatment and Discharge    Name: Jamie Calixto  : 1970  MRN: [id number]  Date of Service: 2022    Discharge Recommendations:  Home with assist PRN  OT Equipment Recommendations  Equipment Needed: No     Patient Diagnosis(es): The primary encounter diagnosis was Chest pain, unspecified type. A diagnosis of Dizziness was also pertinent to this visit. Past Medical History:  has a past medical history of Acid reflux, Anxiety, IBS (irritable bowel syndrome), PONV (postoperative nausea and vomiting), Prolonged emergence from general anesthesia, Salmonella, Shoulder injury, and Unspecified cerebral artery occlusion with cerebral infarction. Past Surgical History:  has a past surgical history that includes shoulder surgery (Left); Colonoscopy; Endoscopy, colon, diagnostic; Hysterectomy; Upper gastrointestinal endoscopy (6/29/15); and Colonoscopy (6/29/15). Assessment   Performance deficits / Impairments:  (Pt is a functional baseline)  Assessment: Pt is a 46 yo F who presented to [facility] w/ dizziness and chest pains. Pt lives w/ spouse in single story home, IND w/ ADLs and amb w/o AD and works full-time. Pt suffered a fall in 2020 which resulted in a skull fx and pt has had residual dizziness and headaches since. Upon eval, pt IND w/ bed mob and SPV for all func mob/transfers. Pt denies concerns w/ ADL task completion; discussed compensatory techniques for decreasing vestibular-related symptoms in the home environment. Pt does not display any acute deficits to be addressed by OT at this time; OT signing off. Rec home w/ PRN assist upon d/c to maximize pt's functional status and safety upon return to home envrionment.   Prognosis: Good  Decision Making: Low Complexity  No Skilled OT: Independent with ADL's;At baseline function  REQUIRES OT FOLLOW-UP: No  Activity Tolerance  Activity Tolerance: Patient Tolerated treatment well        AM-PAC Daily Activity Inpatient   How much help for putting on and taking off regular lower body clothing?: None  How much help for Bathing?: None  How much help for Toileting?: None  How much help for putting on and taking off regular upper body clothing?: None  How much help for taking care of personal grooming?: None  How much help for eating meals?: None  AM-PAC Inpatient Daily Activity Raw Score: 24  AM-PAC Inpatient ADL T-Scale Score : 57.54  ADL Inpatient CMS 0-100% Score: 0  ADL Inpatient CMS G-Code Modifier : [address]     Plan   Occupational Therapy Plan  Times Per Week: 1x visit     Restrictions  Restrictions/Precautions  Restrictions/Precautions: General Precautions, NPO, Up as Tolerated  Required Braces or Orthoses?: No    Subjective   General  Chart Reviewed: Yes, Orders, Progress Notes, History and Physical  Patient assessed for rehabilitation services?: Yes  Subjective  Subjective: pt resting in bed, agreeable to OT eval  Pain: pt endorsed 6/10 pain in headache    Social/Functional History  Social/Functional History  Lives With: Spouse  Type of Home: House  Home Layout: One level  Home Access: Stairs to enter without rails  Entrance Stairs - Number of Steps: 2  Bathroom Shower/Tub: Walk-in shower, Tub/Shower unit  Bathroom Toilet: Standard  Bathroom Equipment: Built-in shower seat  Home Equipment: Luong Sames, rolling  Has the patient had two or more falls in the past year or any fall with injury in the past year?: No (prior fall in 2020 after a hip surgery and suffered skull fx. reports intermitent headaches and dizziness since)  ADL Assistance: Independent  Homemaking Assistance: Independent  Homemaking Responsibilities: Yes  Ambulation Assistance: Independent  Transfer Assistance: Independent  Active : Yes  Occupation: Full time employment  Leisure & Hobbies: likes to go camping     Objective   Heart Rate: 83  Heart Rate Source: Monitor  BP: 128/83  BP Location: Right upper arm  BP Method: Automatic  Patient Position: Semi fowlers  MAP (Calculated): 98  Resp: 18  SpO2: 96 %  O2 Device: None (Room air)       Observation/Palpation  Posture: Good  Safety Devices  Type of Devices: Bed alarm in place;Call light within reach;Gait belt;Left in bed;Nurse notified  Restraints  Restraints Initially in Place: No    Bed Mobility Training  Bed Mobility Training: Yes  Overall Level of Assistance: Independent  Rolling: Independent  Supine to Sit: Independent  Sit to Supine: Independent  Scooting: Independent  Balance  Sitting: Intact  Standing: Impaired  Standing - Static: Good  Standing - Dynamic: Fair  Transfer Training  Transfer Training: Yes  Overall Level of Assistance: Supervision  Sit to Stand: Supervision  Stand to Sit: Supervision  Toilet Transfer: Supervision  Gait Training  Gait Training: Yes  Gait  Overall Level of Assistance: Supervision  Interventions: Verbal cues  Speed/Steffi: Pace decreased (< 100 feet/min)  Distance (ft): 300 Feet  Assistive Device: Gait belt (no AD)     AROM: Within functional limits  PROM: Within functional limits  Strength: Within functional limits  Coordination: Within functional limits    ADL  Additional Comments: pt declined all ADL participation, stating \"I don't feel comfortable doing those in front of you but I have no problems with all that stuff\"     Activity Tolerance  Activity Tolerance: Patient tolerated evaluation without incident     Vision  Vision: Impaired  Tracking: Able to track stimulus in all quads w/o difficulty  Saccades: Additional eye shifts occurred during testing  Convergence: Breaks at 8 from nose  Hearing  Hearing: Within functional limits    Cognition  Overall Cognitive Status: WNL  Orientation  Overall Orientation Status: Within Normal Limits  Orientation Level: Oriented X4     Education Given To: Patient  Education Provided: Role of Therapy;Plan of Care;Precautions;IADL Safety; Fall Prevention Strategies; ADL Adaptive Strategies  Education Method: Demonstration;Verbal  Barriers to Learning: None  Education Outcome: Demonstrated understanding;Verbalized understanding    Disease Specific Education: Pt educated on importance of OOB mobility, prevention of complications of bedrest, and general safety during hospitalization. Pt verbalized understanding      Goals  Short Term Goals  Time Frame for Short Term Goals: 1x visit  Short Term Goal 1: Pt will complete bed mob w/ IND - goal met 12/06  Short Term Goal 2: Pt will complete func mob/transfers w/ IND - goal met 12/06  Patient Goals   Patient goals : \"I'm ready to go home\"       Therapy Time   Individual Concurrent Group Co-treatment   Time In 1015         Time Out 1034         Minutes 19         Timed Code Treatment Minutes: 9 Minutes (10 min eval)     If pt is unable to be seen after this session, please let this note serve as discharge summary. Please see case management note for discharge disposition. Thank you.      Otis Hammer, OTR/L

## 2022-12-06 NOTE — PROGRESS NOTES
TYlenol given for a headache with 30ml of water. Dr Fracne Sparks ok  pt to go off floor w/o tele monitor. MRI notified.

## 2022-12-06 NOTE — PROGRESS NOTES
SLP Attempt Note        Name: Luz Fang  : 1970  Medical Diagnosis: Dizziness [R42]  Chest pain [R07.9]  Chest pain, unspecified type [R07.9]    SLP acknowledged swallow eval order and chart reviewed pt. Per chart review pt is off floor for MRI. SLP attempted to call RN twice however, SLP unable to reach RN. Will cont to attempt to contact RN prior to completing eval to ensure it is medically appropriate for pt to participate. No charges filed at this time. Thank you.        Francine MA-SLP  Clinical Fellow  [id number]

## 2022-12-06 NOTE — PROGRESS NOTES
Physical Therapy  Facility/Department: [place] [address] [facility]  Physical Therapy Initial Assessment    Name: Sushila Shepherd  : 1970  MRN: [id number]  Date of Service: 2022    Discharge Recommendations:  Home with assist PRN, Outpatient PT   PT Equipment Recommendations  Equipment Needed: No      Patient Diagnosis(es): The primary encounter diagnosis was Chest pain, unspecified type. A diagnosis of Dizziness was also pertinent to this visit. Past Medical History:  has a past medical history of Acid reflux, Anxiety, IBS (irritable bowel syndrome), PONV (postoperative nausea and vomiting), Prolonged emergence from general anesthesia, Salmonella, Shoulder injury, and Unspecified cerebral artery occlusion with cerebral infarction. Past Surgical History:  has a past surgical history that includes shoulder surgery (Left); Colonoscopy; Endoscopy, colon, diagnostic; Hysterectomy; Upper gastrointestinal endoscopy (6/29/15); and Colonoscopy (6/29/15). Assessment   Body Structures, Functions, Activity Limitations Requiring Skilled Therapeutic Intervention: Decreased endurance  Assessment: Pt is a 47 y.o. female who is admitted to [facility] for chest pain and dizziness. Pt notes she lives in 1 story house with supportive , has 2 curb style steps to enter,indep at baseline with no AD, active  and currently works. Pt notes she has had a history of dizziness ever since fall with skull fx and concussion ~ 2 years ago, past treatment with epley helped. Pt currently does not display any subjective symptoms or nystagmus with Jeff-hallpike testing. Currently pt prefroms bed mobility indep and transfers / AMB up to 300ft with supervision. Pt currently functioning at baseline, no further acute physical therapy needs at this time. Pt is appropriate to return home with assist as needed from  and outpatient physical therapy services for vestibular therapy.   Therapy Prognosis: Good  Decision Making: Medium Complexity  No Skilled PT: At baseline function  Requires PT Follow-Up: No  Activity Tolerance  Activity Tolerance: Patient tolerated evaluation without incident     Plan   Physcial Therapy Plan  General Plan: Discharge with evaluation only  Safety Devices  Type of Devices: Bed alarm in place, Call light within reach, Gait belt, Left in bed, Nurse notified  Restraints  Restraints Initially in Place: No     Restrictions  Restrictions/Precautions  Restrictions/Precautions: General Precautions, NPO, Up as Tolerated  Required Braces or Orthoses?: No     Subjective   Pain: pt endorsed 6/10 pain in headache  General  Chart Reviewed: Yes  Patient assessed for rehabilitation services?: Yes  Family / Caregiver Present: No  Referring Practitioner: April Pang MD  Referral Date : 12/05/22  Diagnosis: chest pain / dizziness  Follows Commands: Within Functional Limits  Subjective  Subjective: Pt supine in bed at start of session, agreeable to therapy         Social/Functional History  Social/Functional History  Lives With: Spouse  Type of Home: House  Home Layout: One level  Home Access: Stairs to enter without rails  Entrance Stairs - Number of Steps: 2  Bathroom Shower/Tub: Walk-in shower, Tub/Shower unit  Bathroom Toilet: Standard  Bathroom Equipment: Built-in shower seat  Home Equipment: waylon Coleman  Has the patient had two or more falls in the past year or any fall with injury in the past year?: No (prior fall in 2020 after a hip surgery and suffered skull fx. reports intermitent headaches and dizziness since)  ADL Assistance: Independent  Homemaking Assistance: Independent  Homemaking Responsibilities: Yes  Ambulation Assistance: Independent  Transfer Assistance: Independent  Active : Yes  Occupation: Full time employment  Leisure & Hobbies: likes to go [facility]  [address]: Impaired  Tracking: Able to track stimulus in all quads w/o difficulty  Saccades:  Additional eye shifts occurred during testing  Convergence: Breaks at 8 from nose    Cognition   Orientation  Overall Orientation Status: Within Normal Limits  Orientation Level: Oriented X4  Cognition  Overall Cognitive Status: WNL     Objective   Heart Rate: 83  Heart Rate Source: Monitor  BP: 128/83  BP Location: Right upper arm  BP Method: Automatic  Patient Position: Semi fowlers  MAP (Calculated): 98  Resp: 18  SpO2: 96 %  O2 Device: None (Room air)     Observation/Palpation  Posture: Good  Gross Assessment  AROM: Within functional limits  PROM: Within functional limits  Strength:  Within functional limits  Coordination: Within functional limits  Tone: Normal  Sensation: Intact     VBI test: negative bilaterally   Smooth pursuit: normal   Convergence: normal   Jeff hallpike testing: negative bilaterally                 Bed Mobility Training  Bed Mobility Training: Yes  Overall Level of Assistance: Independent  Rolling: Independent  Supine to Sit: Independent  Sit to Supine: Independent  Scooting: Independent  Balance  Sitting: Intact  Standing: Impaired  Standing - Static: Good  Standing - Dynamic: Fair  Transfer Training  Transfer Training: Yes  Overall Level of Assistance: Supervision  Sit to Stand: Supervision  Stand to Sit: Supervision  Gait Training  Gait Training: Yes  Gait  Overall Level of Assistance: Supervision  Interventions: Verbal cues  Speed/Steffi: Pace decreased (< 100 feet/min)  Distance (ft): 300 Feet  Assistive Device: Gait belt (no AD)                 Exercise Treatment: PT completed 1x10 standing marches, sit to stands        AM-PAC Score  AM-PAC Inpatient Mobility Raw Score : 23 (12/06/22 1031)  AM-PAC Inpatient T-Scale Score : 56.93 (12/06/22 1031)  Mobility Inpatient CMS 0-100% Score: 11.2 (12/06/22 1031)  Mobility Inpatient CMS G-Code Modifier : CI (12/06/22 1031)            Goals  Short Term Goals  Time Frame for Short Term Goals: by 1 session  Short Term Goal 1: Pt is to prefrom bed mobility indep - goal met 12/06  Short Term Goal 2: Pt is to AMB 300ft with SUP and no AD - goal met 12/06  Patient Goals   Patient Goals : \"to go home\"       Education  Patient Education  Education Given To: Patient  Education Provided: Role of Therapy;Plan of Care (education on benefits of vestibular therapy)  Education Method: Verbal;Demonstration  Barriers to Learning: None  Education Outcome: Verbalized understanding      Therapy Time   Individual Concurrent Group Co-treatment   Time In 0941         Time Out 1015         Minutes 34         Timed Code Treatment Minutes: 24 Minutes (10 min eval)     If pt is unable to be seen after this session, please let this note serve as discharge summary. Please see case management note for discharge disposition. Thank you.     Karan Nieto, PT

## 2022-12-06 NOTE — CARE COORDINATION
Chart reviewed, pt in Observation. Pt from home with , independent, works, drives. Pt has PCP, has [employer]. PT/OT recommending home with assist PRN, OP PT. No discharge needs noted, please notify CM if needs arise.   Angelita Rivers, ROSELINE-RN

## 2022-12-07 VITALS
BODY MASS INDEX: 28.49 KG/M2 | HEIGHT: 60 IN | HEART RATE: 93 BPM | SYSTOLIC BLOOD PRESSURE: 123 MMHG | RESPIRATION RATE: 16 BRPM | WEIGHT: 145.1 LBS | DIASTOLIC BLOOD PRESSURE: 81 MMHG | OXYGEN SATURATION: 94 % | TEMPERATURE: 98.1 F

## 2022-12-07 LAB
EKG ATRIAL RATE: 93 BPM
EKG DIAGNOSIS: NORMAL
EKG P AXIS: 58 DEGREES
EKG P-R INTERVAL: 150 MS
EKG Q-T INTERVAL: 342 MS
EKG QRS DURATION: 74 MS
EKG QTC CALCULATION (BAZETT): 425 MS
EKG R AXIS: 44 DEGREES
EKG T AXIS: 53 DEGREES
EKG VENTRICULAR RATE: 93 BPM

## 2022-12-07 PROCEDURE — 99214 OFFICE O/P EST MOD 30 MIN: CPT | Performed by: NURSE PRACTITIONER

## 2022-12-07 PROCEDURE — 93005 ELECTROCARDIOGRAM TRACING: CPT | Performed by: INTERNAL MEDICINE

## 2022-12-07 PROCEDURE — 2580000003 HC RX 258: Performed by: INTERNAL MEDICINE

## 2022-12-07 PROCEDURE — 6370000000 HC RX 637 (ALT 250 FOR IP): Performed by: INTERNAL MEDICINE

## 2022-12-07 PROCEDURE — 93010 ELECTROCARDIOGRAM REPORT: CPT | Performed by: INTERNAL MEDICINE

## 2022-12-07 PROCEDURE — G0378 HOSPITAL OBSERVATION PER HR: HCPCS

## 2022-12-07 RX ORDER — MECLIZINE HYDROCHLORIDE 25 MG/1
25 TABLET ORAL 3 TIMES DAILY PRN
Qty: 15 TABLET | Refills: 0
Start: 2022-12-07 | End: 2022-12-12

## 2022-12-07 RX ORDER — ASPIRIN 81 MG/1
81 TABLET, CHEWABLE ORAL DAILY
Qty: 30 TABLET | Refills: 3 | Status: CANCELLED | OUTPATIENT
Start: 2022-12-08

## 2022-12-07 RX ADMIN — ASPIRIN 81 MG: 81 TABLET, CHEWABLE ORAL at 08:27

## 2022-12-07 RX ADMIN — ACETAMINOPHEN 650 MG: 325 TABLET, FILM COATED ORAL at 08:27

## 2022-12-07 RX ADMIN — SODIUM CHLORIDE, PRESERVATIVE FREE 10 ML: 5 INJECTION INTRAVENOUS at 08:29

## 2022-12-07 RX ADMIN — ACETAMINOPHEN 650 MG: 325 TABLET, FILM COATED ORAL at 13:22

## 2022-12-07 RX ADMIN — TIMOLOL MALEATE 1 DROP: 2.5 SOLUTION OPHTHALMIC at 08:28

## 2022-12-07 NOTE — PROGRESS NOTES
[address]   Daily Progress Note    Admit Date:  12/5/2022  HPI:    Chief Complaint   Patient presents with    Chest Pain     Mid-sternal chest pressure x3 weeks. Dizziness     Almost passed out. Intermittent left sided neck pain. Interval history: Naman Weaver is being followed for chest pain. Subjective:  Ms. Bob Reyes had another episode of chest pain earlier today, went away. EKG without ischemic changes. No chest pain at the time of my evaluation. Objective:   /81   Pulse 93   Temp 98.1 °F (36.7 °C) (Oral)   Resp 16   Ht 5' (1.524 m)   Wt 145 lb 1.6 oz (65.8 kg)   SpO2 94%   BMI 28.34 kg/m²     Intake/Output Summary (Last 24 hours) at 12/7/2022 1344  Last data filed at 12/7/2022 1058  Gross per 24 hour   Intake 180 ml   Output --   Net 180 ml       NYHA: II    Physical Exam:  General:  Awake, alert, NAD  Skin:  Warm and dry  Neck:  JVD<8  Chest:  Clear to auscultation, no wheezes/rhonchi/rales  Telemetry: NSR   Cardiovascular:  RRR S1S2, no m/r/g   Abdomen:  Soft, nontender, +bowel sounds  Extremities:  no  bilateral lower extremity edema    Medications:    sodium chloride flush  5-40 mL IntraVENous 2 times per day    aspirin  81 mg Oral Daily    atorvastatin  40 mg Oral Nightly    enoxaparin  40 mg SubCUTAneous Daily    timolol  1 drop Both Eyes BID      sodium chloride         Lab Data:  CBC:   Recent Labs     12/05/22  1148 12/06/22  0636   WBC 4.9 3.7*   HGB 14.6 13.9    242     BMP:    Recent Labs     12/05/22  1148      K 3.7   CO2 25   BUN 14   CREATININE 0.7     INR:  No results for input(s): INR in the last 72 hours. BNP:  No results for input(s): PROBNP in the last 72 hours. No results found for: LVEF, LVEFMODE    Testing:  Stress 4/8/22  o Negative ECG for ischemia with graded exercise test.      o Duke Treadmill Score is 5 which indicates low risk. o Negative stress echo with no indication of inducible ischemia.        Echo 12/6/22   Summary   Normal left ventricular systolic function with ejection fraction of 55-60%. No regional wall motion abnormalites are seen. Grade I diastolic dysfunction with normal filling pressure. Mild mitral regurgitation. Chest CT 12/6/22  1. No evidence of aortic aneurysm or dissection. No acute pulmonary artery   abnormality. No evidence of lymphadenopathy. 2. Left lower lobe pleural based pulmonary nodule which is partly calcified   measuring 1.5 x 0.8 cm. Follow-up is recommended. Principal Problem:    Chest pain  Active Problems:    Hyperlipidemia  Resolved Problems:    * No resolved hospital problems. *      Assessment:  Chest pain- atypical  HLD  Incidental pulmonary nodule noted on CT scan- per IM     Plan:    I called [facility]ology [facility], they will re-file the final report for the cardiac calcium scan portion. From what I can see in PACs, appears cardiac calcium score is 0. Discussed testing with patient, negative troponin, EKG without ischemic changes, along with no wall motion abnormalities on echo with calcium score being 0, would recommend evaluation with GI (has appt on 12/22). Okay to hold off on baby aspirin at this time  Okay to hold crestor for 2 weeks and see if symptoms improve, if not then would recommend restarting. Follow up with her primary cardiologist as planned.          Maninder Taylor, KANDY - CNP,  12/7/2022, 1:59 PM

## 2022-12-07 NOTE — PROGRESS NOTES
Hospitalist Progress Note      PCP: Gerard Armstrong MD    Date of Admission: 12/5/2022    Chief Complaint: Chest pain, dizziness    Hospital Course: H&P reviewed    Subjective:     Patient reports mild neck pain otherwise denies any chest pain or focal weakness. Medications:  Reviewed    Infusion Medications    sodium chloride       Scheduled Medications    sodium chloride flush  5-40 mL IntraVENous 2 times per day    aspirin  81 mg Oral Daily    atorvastatin  40 mg Oral Nightly    enoxaparin  40 mg SubCUTAneous Daily    timolol  1 drop Both Eyes BID     PRN Meds: aspirin-acetaminophen-caffeine, sodium chloride flush, sodium chloride, ondansetron **OR** ondansetron, acetaminophen **OR** acetaminophen, polyethylene glycol, perflutren lipid microspheres    No intake or output data in the 24 hours ending 12/07/22 0825    Physical Exam Performed:    /62   Pulse 80   Temp 97.6 °F (36.4 °C) (Oral)   Resp 18   Ht 5' (1.524 m)   Wt 145 lb 1.6 oz (65.8 kg)   SpO2 97%   BMI 28.34 kg/m²     General appearance: No apparent distress, appears stated age and cooperative. HEENT: Pupils equal, round, onjunctivae/corneas clear. Neck: Supple, with full range of motion. No jugular venous distention. Trachea midline. Respiratory:  Normal respiratory effort. Clear to auscultation, bilaterally without Rales/Wheezes/Rhonchi. Cardiovascular: Regular rate and rhythm with normal S1/S2 without murmurs, rubs or gallops. Abdomen: Soft, non-tender, non-distended with normal bowel sounds. Musculoskeletal: No clubbing, cyanosis or edema bilaterally. Full range of motion without deformity. Skin: Skin color, texture, turgor normal.  No rashes or lesions. Neurologic:  Neurovascularly intact without any focal sensory/motor deficits.  Cranial nerves: II-XII intact, grossly non-focal.  Psychiatric: Alert and oriented, thought content appropriate, normal insight        Labs:   Recent Labs     12/05/22  1148 12/06/22  0636 WBC 4.9 3.7*   HGB 14.6 13.9   HCT 43.1 41.6    242       Recent Labs     12/05/22  1148      K 3.7      CO2 25   BUN 14   CREATININE 0.7   CALCIUM 9.6       Recent Labs     12/05/22  1148   AST 21   ALT 20   BILITOT 0.4   ALKPHOS 64       No results for input(s): INR in the last 72 hours. Recent Labs     12/05/22  1148 12/06/22  0636   TROPONINI <0.01 <0.01         Urinalysis:      Lab Results   Component Value Date/Time    NITRU Negative 06/10/2018 05:40 PM    WBCUA 0-2 06/10/2018 05:40 PM    BACTERIA Rare 06/10/2018 05:40 PM    RBCUA 0-2 06/10/2018 05:40 PM    BLOODU MODERATE 06/10/2018 05:40 PM    SPECGRAV 1.025 06/10/2018 05:40 PM    GLUCOSEU Negative 06/10/2018 05:40 PM       Radiology:  CTA CARDIAC W C STRC MORP W CONTRAST   Final Result   1. No evidence of aortic aneurysm or dissection. No acute pulmonary artery   abnormality. No evidence of lymphadenopathy. 2. Left lower lobe pleural based pulmonary nodule which is partly calcified   measuring 1.5 x 0.8 cm. Follow-up is recommended. RECOMMENDATIONS:   Fleischner Society guidelines for follow-up and management of incidentally   detected pulmonary nodules:      Single Solid Nodule:      Nodule size less than 6 mm   In a low-risk patient, no routine follow-up. In a high-risk patient, optional CT at 12 months. Nodule size equals 6-8 mm   In a low-risk patient, CT at 6-12 months, then consider CT at 18-24 months. In a high-risk patient, CT at 6-12 months, then CT at 18-24 months. Nodule size greater than 8 mm         In a low-risk patient, consider CT at 3 months, PET/CT, or tissue sampling. In a high-risk patient, consider CT at 3 months, PET/CT, or tissue sampling. Multiple Solid Nodules:      Nodule size less than 6 mm   In a low-risk patient, no routine follow-up. In a high-risk patient, optional CT at 12 months.       Nodule size equals 6-8 mm   In a low-risk patient, CT at 3-6 months, then consider CT at 18-24 months. In a high-risk patient, CT at 3-6 months, then CT at 18-24 months. Nodule size greater than 8 mm   In a low-risk patient, CT at 3-6 months, then consider CT at 18-24 months. In a high-risk patient, CT at 3-6 months, then CT at 18-24 months. - Low risk patients include individuals with minimal or absent history of   smoking and other known risk factors. - High risk patients include individuals with a history or smoking or known   risk factors. Radiology 2017 http://pubs. rsna.org/doi/full/10.1148/radiol. 7932344497         MRI BRAIN WO CONTRAST   Final Result   No acute intracranial abnormality. RECOMMENDATIONS:   Unavailable         CTA HEAD NECK W CONTRAST   Final Result   Unremarkable CTA of the head and neck. CT HEAD WO CONTRAST   Final Result   No acute intracranial abnormality. XR CHEST (2 VW)   Final Result   No acute process. Cardiac echo   Summary   Normal left ventricular systolic function with ejection fraction of 55-60%. No regional wall motion abnormalites are seen. Grade I diastolic dysfunction with normal filling pressure. Mild mitral regurgitation. IP CONSULT TO CARDIOLOGY    Assessment/Plan:    Active Hospital Problems    Diagnosis     Hyperlipidemia [E78.5]      Priority: Medium    Chest pain [R07.9]      Priority: Medium        Chest pain: exertional, concerning for possible ACS. Had normal stress echo  in April 2022. Had recently seen her outpatient cardiologist at [address] [place] [address] had plans for outpatient cardiac CT. Serial troponin neg. Cardiology assisting. Cardiac CTA revealed no aortic aneurysm or dissection. Cardiac echo showed normal EF. Dizziness with transient numbness in fingers: Seems resolved. Acute  CVA ruled out with MRI which was nonacute. Orthostats negative. May have peripheral vertigo vrs TIA. PT OT recommended outpatient PT for vestibular therapy. Continue aspirin, statin. Anxiety: Mood stable. continue ativan PRN. No longer on vilazodone. Hx of Occular HTN : per pt. On timolol eye drops- continue        LLL Pulm nodule:1.5x 0.8cm. incidentally noted on CT. Outpatient surveillance with repeat CT in 3 months to be followed up by her PCP which was discussed with patient        DVT Prophylaxis: Lovenox  Diet: ADULT DIET; Regular; Low Fat/Low Chol/High Fiber/XIN  Code Status: Full Code  PT/OT Eval Status: Ordered    Dispo -plans for possible discharge today. However was later called by RN that patient reported chest pain. Will check stat EKG for any acute ST-T changes. Cardiology notified.   Await further cardio recs    Appropriate for A1 Discharge Unit: No      Kelvin Miller MD

## 2022-12-07 NOTE — DISCHARGE INSTRUCTIONS
Follow up with PCP in one week   Follow up with your cardiologist at [facility].    Hold crestor for 2 weeks   Follow up with GI as scheduled

## 2022-12-07 NOTE — CARE COORDINATION
CASE MANAGEMENT INITIAL ASSESSMENT      Reviewed chart and completed assessment with patient:yes  Family present: NA  Explained Case Management role/services. yes    Primary contact information:    Health Care Decision Maker :           Can this person be reached and be able to respond quickly, such as within a few minutes or hours? No      Admit date/status:12/7/22  Diagnosis:dizziness   Is this a Readmission?:  No      Insurance:wellcare   Precert required for SNF: Yes       3 night stay required: No    Living arrangements, Adls, care needs, prior to admission:home with , independent in ADLs    Durable Medical Equipment at home:  Walker__Cane__RTS__ BSC__Shower Chair__  02__ HHN__ CPAP__  BiPap__  Hospital Bed__ W/C___ Other_____    Services in the home and/or outpatient, prior to admission:none    Current PCP: [facility]                                Medications:yes Prescription coverage? Yes Will pt require financial assistance with medications No     Transportation needs: car     Dialysis Facility (if applicable)   Name:  Address:  Dialysis Schedule:  Phone:  Fax:    PT/OT recs:    Hospital Exemption Notification (HEN):    Barriers to discharge:medical complications    Plan/comments:Referred to patient for d/c planning. Spoke to patient. Patient is a 47year old female admitted for dizziness. Patient lives at home with . Patient reports she lives at home with . Patient currently denies d/c needs.   Electronically signed by SATISH Hand on 12/7/2022 at 10:50 AM      ECOC on chart for MD signature

## 2022-12-07 NOTE — PROGRESS NOTES
Speech Language Pathology  Consult Note    SLP received consult and completed chart review. RN reports pt is having no difficulty with speaking/swallowing, and formal evaluation is not indicated at this time. Pt politely declines evaluation by SLP. Please re-consult SLP if such need should arise in the future. Aracelis Garsia, [address] [place] [address][id number]  Speech-Language Pathologist  (desk #): [phone]

## 2022-12-07 NOTE — PROGRESS NOTES
Pt's IV line removed without complications. Discussed d/c instructions with patient, given opportunity to ask questions, and provided new medication education with side effects. Follow up appointment information included in d/c instructions. Pt verbalized understanding of d/c instructions. Patient was discharged to home with all belongings and taken outside via wheelchair.     Marlen Gallardo RN

## 2022-12-09 NOTE — DISCHARGE SUMMARY
Hospital Medicine Discharge Summary    Patient ID: Luz Fang      Patient's PCP: Jairon Guevara MD    Admit Date: 12/5/2022     Discharge Date: 12/7/2022      Admitting Provider: Andra Crandall MD     Discharge Provider: Andra Crandall MD     Discharge Diagnoses:    -Chest pain  -Hyperlipidemia  -Dizziness  -Lung nodule  -Hypertension      The patient was seen and examined on day of discharge and this discharge summary is in conjunction with any daily progress note from day of discharge. Hospital Course:        Chest pain: exertional, concerning for ACS. Serial troponin neg. She   had normal stress echo  in April 2022. Had recently seen her outpatient cardiologist at [address] [place] [address] had plans for outpatient cardiac CT. Cardiology consulted and recommended  Cardiac CTA revealed no aortic aneurysm or dissection. Cardiac echo showed normal EF. Patient had  chest pain on day of discharge. Stat EKG was nonacute. Cardio re- evaluated patient and felt it was noncardiac and recommended she follow-up with GI for eval. Already had an upcoming outpatient  appointment  with GI on 12/22/2022 which  she was advised to keep. Patient had reported her symptoms started close to when she was started on Crestor 2 months ago. She was advised to hold her Crestor for 2 weeks to see if her symptoms improve. Patient follow-up with her outpatient cardiologist.  Judith Mayo recommended to stop aspirin and was not continued at discharge. Dizziness with transient numbness in fingers:  CVA work-up was negative. MRI nonacute. PT OT recommended outpatient PT for vestibular therapy. ASA and statin d/aquilino as recommended by cardio. Anxiety: Mood stable. continue ativan PRN. No longer on vilazodone. Hx of Occular HTN : per pt. On timolol eye drops- continue         LLL Pulm nodule:1.5x 0.8cm. incidentally noted on CT.   Outpatient surveillance with repeat CT in 3 months to be followed up by her PCP which was discussed with patient     Hypertension: Continue Lopressor    Physical Exam Performed:     /81   Pulse 93   Temp 98.1 °F (36.7 °C) (Oral)   Resp 16   Ht 5' (1.524 m)   Wt 145 lb 1.6 oz (65.8 kg)   SpO2 94%   BMI 28.34 kg/m²       General appearance:  No apparent distress, appears stated age and cooperative. HEENT:  Normal cephalic, atraumatic without obvious deformity. Pupils equal, round,Extra ocular muscles intact. Conjunctivae/corneas clear. Neck: Supple, with full range of motion. No jugular venous distention. Trachea midline. Respiratory:  Normal respiratory effort. Clear to auscultation, bilaterally without Rales/Wheezes/Rhonchi. Cardiovascular:  Regular rate and rhythm with normal S1/S2 without murmurs, rubs or gallops. Abdomen: Soft, non-tender, non-distended with normal bowel sounds. Musculoskeletal:  No clubbing, cyanosis or edema bilaterally. Skin: Skin color, texture, turgor normal.  No rashes or lesions. Neurologic:  Neurovascularly intact without any focal sensory/motor deficits. Cranial nerves: II-XII intact, grossly non-focal.  Psychiatric:  Alert and oriented, thought content appropriate, normal insight      Labs: For convenience and continuity at follow-up the following most recent labs are provided:      CBC:    Lab Results   Component Value Date/Time    WBC 3.7 12/06/2022 06:36 AM    HGB 13.9 12/06/2022 06:36 AM    HCT 41.6 12/06/2022 06:36 AM     12/06/2022 06:36 AM       Renal:    Lab Results   Component Value Date/Time     12/05/2022 11:48 AM    K 3.7 12/05/2022 11:48 AM     12/05/2022 11:48 AM    CO2 25 12/05/2022 11:48 AM    BUN 14 12/05/2022 11:48 AM    CREATININE 0.7 12/05/2022 11:48 AM    CALCIUM 9.6 12/05/2022 11:48 AM         Significant Diagnostic Studies    Radiology:   CTA CARDIAC W C STRC MORP W CONTRAST   Final Result   1. No evidence of aortic aneurysm or dissection. No acute pulmonary artery   abnormality.   No evidence of lymphadenopathy. 2. Left lower lobe pleural based pulmonary nodule which is partly calcified   measuring 1.5 x 0.8 cm. Follow-up is recommended. RECOMMENDATIONS:   Fleischner Society guidelines for follow-up and management of incidentally   detected pulmonary nodules:      Single Solid Nodule:      Nodule size less than 6 mm   In a low-risk patient, no routine follow-up. In a high-risk patient, optional CT at 12 months. Nodule size equals 6-8 mm   In a low-risk patient, CT at 6-12 months, then consider CT at 18-24 months. In a high-risk patient, CT at 6-12 months, then CT at 18-24 months. Nodule size greater than 8 mm         In a low-risk patient, consider CT at 3 months, PET/CT, or tissue sampling. In a high-risk patient, consider CT at 3 months, PET/CT, or tissue sampling. Multiple Solid Nodules:      Nodule size less than 6 mm   In a low-risk patient, no routine follow-up. In a high-risk patient, optional CT at 12 months. Nodule size equals 6-8 mm   In a low-risk patient, CT at 3-6 months, then consider CT at 18-24 months. In a high-risk patient, CT at 3-6 months, then CT at 18-24 months. Nodule size greater than 8 mm   In a low-risk patient, CT at 3-6 months, then consider CT at 18-24 months. In a high-risk patient, CT at 3-6 months, then CT at 18-24 months. - Low risk patients include individuals with minimal or absent history of   smoking and other known risk factors. - High risk patients include individuals with a history or smoking or known   risk factors. Radiology 2017 http://pubs. rsna.org/doi/full/10.1148/radiol. 5806335604         MRI BRAIN WO CONTRAST   Final Result   No acute intracranial abnormality. RECOMMENDATIONS:   Unavailable         CTA HEAD NECK W CONTRAST   Final Result   Unremarkable CTA of the head and neck. CT HEAD WO CONTRAST   Final Result   No acute intracranial abnormality.          XR CHEST (2 VW)   Final Result No acute process. Consults:     IP CONSULT TO CARDIOLOGY    Disposition:   Home      Condition at Discharge: Stable    Discharge Instructions/Follow-up:    Follow up with PCP in one week   Follow up with your cardiologist at [facility]. Hold crestor for 2 weeks   Follow up with GI as scheduled       Code Status:   Full code     Activity: activity as tolerated    Diet: cardiac diet      Discharge Medications:     Discharge Medication List as of 12/7/2022  2:49 PM             Details   meclizine (ANTIVERT) 25 MG tablet Take 1 tablet by mouth 3 times daily as needed for Dizziness, Disp-15 tablet, R-0NO PRINT                Details   timolol (TIMOPTIC) 0.25 % ophthalmic solution Place 1 drop into the right eye in the morning and 1 drop before bedtime. , Disp-1 each, R-4Normal      aspirin-acetaminophen-caffeine (EXCEDRIN EXTRA STRENGTH) 250-250-65 MG per tablet Take 1 tablet by mouth every 6 hours as needed for Headaches, Disp-20 tablet, R-0Print      metoprolol tartrate (LOPRESSOR) 25 MG tablet Take 25 mg by mouth dailyHistorical Med      ondansetron (ZOFRAN) 4 MG tablet Take 1 tablet by mouth every 8 hours as needed for Nausea, Disp-20 tablet, R-0Print      LORazepam (ATIVAN) 0.5 MG tablet Take 0.5 mg by mouth as needed for AnxietyHistorical Med             Time Spent on discharge: 45 minutes  in the examination, evaluation, counseling and review of medications and discharge plan. Signed:    River Dailey MD   12/8/2022      Thank you Luz Elena Fairchild MD for the opportunity to be involved in this patient's care. If you have any questions or concerns, please feel free to contact me at [phone].

## 2022-12-18 ENCOUNTER — TELEPHONE (OUTPATIENT)
Dept: CASE MANAGEMENT | Age: 52
End: 2022-12-18

## 2022-12-18 NOTE — TELEPHONE ENCOUNTER
Imaging report CTA Cardiac 12/6/22 with f/u imaging recommendations sent to Katie Coulter MD    Strongly consider pulmonology referral for nodules of this size    [facility])

## 2023-04-06 ENCOUNTER — HOSPITAL ENCOUNTER (OUTPATIENT)
Dept: CT IMAGING | Age: 53
Discharge: HOME | End: 2023-04-06
Payer: COMMERCIAL

## 2023-04-06 DIAGNOSIS — R10.32 LEFT LOWER QUADRANT PAIN: ICD-10-CM

## 2023-04-06 PROCEDURE — 6360000004 HC RX CONTRAST MEDICATION: Performed by: INTERNAL MEDICINE

## 2023-04-06 PROCEDURE — 74177 CT ABD & PELVIS W/CONTRAST: CPT

## 2023-04-06 RX ADMIN — IOHEXOL 75 ML: 350 INJECTION, SOLUTION INTRAVENOUS at 16:43

## 2023-04-06 RX ADMIN — DIATRIZOATE MEGLUMINE AND DIATRIZOATE SODIUM 12 ML: 660; 100 LIQUID ORAL; RECTAL at 16:43

## 2023-08-11 ENCOUNTER — APPOINTMENT (OUTPATIENT)
Dept: GENERAL RADIOLOGY | Age: 53
End: 2023-08-11
Payer: COMMERCIAL

## 2023-08-11 ENCOUNTER — APPOINTMENT (OUTPATIENT)
Dept: CT IMAGING | Age: 53
End: 2023-08-11
Payer: COMMERCIAL

## 2023-08-11 ENCOUNTER — HOSPITAL ENCOUNTER (EMERGENCY)
Age: 53
Discharge: HOME | End: 2023-08-11
Attending: STUDENT IN AN ORGANIZED HEALTH CARE EDUCATION/TRAINING PROGRAM
Payer: COMMERCIAL

## 2023-08-11 VITALS
SYSTOLIC BLOOD PRESSURE: 117 MMHG | OXYGEN SATURATION: 99 % | WEIGHT: 145 LBS | DIASTOLIC BLOOD PRESSURE: 69 MMHG | RESPIRATION RATE: 16 BRPM | HEIGHT: 60 IN | TEMPERATURE: 97.9 F | BODY MASS INDEX: 28.47 KG/M2 | HEART RATE: 74 BPM

## 2023-08-11 DIAGNOSIS — R51.9 NONINTRACTABLE HEADACHE, UNSPECIFIED CHRONICITY PATTERN, UNSPECIFIED HEADACHE TYPE: Primary | ICD-10-CM

## 2023-08-11 LAB
ALBUMIN SERPL-MCNC: 4.8 G/DL (ref 3.4–5)
ALBUMIN/GLOB SERPL: 1.9 {RATIO} (ref 1.1–2.2)
ALP SERPL-CCNC: 56 U/L (ref 40–129)
ALT SERPL-CCNC: 19 U/L (ref 10–40)
ANION GAP SERPL CALCULATED.3IONS-SCNC: 12 MMOL/L (ref 3–16)
AST SERPL-CCNC: 25 U/L (ref 15–37)
BASOPHILS # BLD: 0 K/UL (ref 0–0.2)
BASOPHILS NFR BLD: 0.7 %
BILIRUB SERPL-MCNC: 0.4 MG/DL (ref 0–1)
BUN SERPL-MCNC: 15 MG/DL (ref 7–20)
CALCIUM SERPL-MCNC: 9.6 MG/DL (ref 8.3–10.6)
CHLORIDE SERPL-SCNC: 104 MMOL/L (ref 99–110)
CO2 SERPL-SCNC: 25 MMOL/L (ref 21–32)
CREAT SERPL-MCNC: 0.8 MG/DL (ref 0.6–1.1)
DEPRECATED RDW RBC AUTO: 12.7 % (ref 12.4–15.4)
EKG ATRIAL RATE: 66 BPM
EKG DIAGNOSIS: NORMAL
EKG P AXIS: 21 DEGREES
EKG P-R INTERVAL: 164 MS
EKG Q-T INTERVAL: 396 MS
EKG QRS DURATION: 78 MS
EKG QTC CALCULATION (BAZETT): 415 MS
EKG R AXIS: 29 DEGREES
EKG T AXIS: 40 DEGREES
EKG VENTRICULAR RATE: 66 BPM
EOSINOPHIL # BLD: 0 K/UL (ref 0–0.6)
EOSINOPHIL NFR BLD: 1 %
GFR SERPLBLD CREATININE-BSD FMLA CKD-EPI: >60 ML/MIN/{1.73_M2}
GLUCOSE BLD-MCNC: 94 MG/DL
GLUCOSE BLD-MCNC: 94 MG/DL (ref 70–99)
GLUCOSE SERPL-MCNC: 105 MG/DL (ref 70–99)
HCT VFR BLD AUTO: 41.4 % (ref 36–48)
HGB BLD-MCNC: 14 G/DL (ref 12–16)
INR PPP: 0.95 (ref 0.84–1.16)
LYMPHOCYTES # BLD: 1.7 K/UL (ref 1–5.1)
LYMPHOCYTES NFR BLD: 38.1 %
MCH RBC QN AUTO: 32.4 PG (ref 26–34)
MCHC RBC AUTO-ENTMCNC: 33.8 G/DL (ref 31–36)
MCV RBC AUTO: 95.9 FL (ref 80–100)
MONOCYTES # BLD: 0.3 K/UL (ref 0–1.3)
MONOCYTES NFR BLD: 7.1 %
NEUTROPHILS # BLD: 2.4 K/UL (ref 1.7–7.7)
NEUTROPHILS NFR BLD: 53.1 %
PERFORMED ON: NORMAL
PLATELET # BLD AUTO: 258 K/UL (ref 135–450)
PMV BLD AUTO: 9 FL (ref 5–10.5)
POTASSIUM SERPL-SCNC: 4.2 MMOL/L (ref 3.5–5.1)
PROT SERPL-MCNC: 7.3 G/DL (ref 6.4–8.2)
PROTHROMBIN TIME: 12.7 SEC (ref 11.5–14.8)
RBC # BLD AUTO: 4.32 M/UL (ref 4–5.2)
SODIUM SERPL-SCNC: 141 MMOL/L (ref 136–145)
TROPONIN, HIGH SENSITIVITY: <6 NG/L (ref 0–14)
WBC # BLD AUTO: 4.4 K/UL (ref 4–11)

## 2023-08-11 PROCEDURE — 85610 PROTHROMBIN TIME: CPT

## 2023-08-11 PROCEDURE — 80053 COMPREHEN METABOLIC PANEL: CPT

## 2023-08-11 PROCEDURE — 93005 ELECTROCARDIOGRAM TRACING: CPT | Performed by: PHYSICIAN ASSISTANT

## 2023-08-11 PROCEDURE — 6360000004 HC RX CONTRAST MEDICATION: Performed by: PHYSICIAN ASSISTANT

## 2023-08-11 PROCEDURE — 85025 COMPLETE CBC W/AUTO DIFF WBC: CPT

## 2023-08-11 PROCEDURE — 6370000000 HC RX 637 (ALT 250 FOR IP): Performed by: PHYSICIAN ASSISTANT

## 2023-08-11 PROCEDURE — 84484 ASSAY OF TROPONIN QUANT: CPT

## 2023-08-11 PROCEDURE — 93010 ELECTROCARDIOGRAM REPORT: CPT | Performed by: INTERNAL MEDICINE

## 2023-08-11 PROCEDURE — 99285 EMERGENCY DEPT VISIT HI MDM: CPT

## 2023-08-11 PROCEDURE — 71045 X-RAY EXAM CHEST 1 VIEW: CPT

## 2023-08-11 PROCEDURE — 70498 CT ANGIOGRAPHY NECK: CPT

## 2023-08-11 PROCEDURE — 70450 CT HEAD/BRAIN W/O DYE: CPT

## 2023-08-11 RX ORDER — TETRACAINE HYDROCHLORIDE 5 MG/ML
1 SOLUTION OPHTHALMIC ONCE
Status: COMPLETED | OUTPATIENT
Start: 2023-08-11 | End: 2023-08-11

## 2023-08-11 RX ORDER — ONDANSETRON 4 MG/1
4-8 TABLET, ORALLY DISINTEGRATING ORAL EVERY 12 HOURS PRN
Qty: 20 TABLET | Refills: 0 | Status: SHIPPED | OUTPATIENT
Start: 2023-08-11

## 2023-08-11 RX ORDER — KETOROLAC TROMETHAMINE 10 MG/1
10 TABLET, FILM COATED ORAL EVERY 6 HOURS PRN
Qty: 12 TABLET | Refills: 0 | Status: SHIPPED | OUTPATIENT
Start: 2023-08-11 | End: 2024-08-10

## 2023-08-11 RX ADMIN — IOPAMIDOL 75 ML: 755 INJECTION, SOLUTION INTRAVENOUS at 11:56

## 2023-08-11 RX ADMIN — TETRACAINE HYDROCHLORIDE 1 DROP: 5 SOLUTION OPHTHALMIC at 13:49

## 2023-08-11 ASSESSMENT — PAIN - FUNCTIONAL ASSESSMENT: PAIN_FUNCTIONAL_ASSESSMENT: NONE - DENIES PAIN

## 2024-06-07 ENCOUNTER — HOSPITAL ENCOUNTER (OUTPATIENT)
Dept: NUCLEAR MEDICINE | Age: 54
Discharge: HOME | End: 2024-06-07
Payer: COMMERCIAL

## 2024-06-07 VITALS — WEIGHT: 141 LBS | BODY MASS INDEX: 27.54 KG/M2

## 2024-06-07 DIAGNOSIS — R10.11 RUQ PAIN: ICD-10-CM

## 2024-06-07 PROCEDURE — 78227 HEPATOBIL SYST IMAGE W/DRUG: CPT

## 2024-06-07 PROCEDURE — A9537 TC99M MEBROFENIN: HCPCS | Performed by: INTERNAL MEDICINE

## 2024-06-07 PROCEDURE — 6360000002 HC RX W HCPCS: Performed by: INTERNAL MEDICINE

## 2024-06-07 PROCEDURE — 2580000003 HC RX 258: Performed by: INTERNAL MEDICINE

## 2024-06-07 PROCEDURE — 3430000000 HC RX DIAGNOSTIC RADIOPHARMACEUTICAL: Performed by: INTERNAL MEDICINE

## 2024-06-07 RX ADMIN — SODIUM CHLORIDE 1.28 MCG: 9 INJECTION, SOLUTION INTRAVENOUS at 14:15

## 2024-06-07 RX ADMIN — Medication 6.7 MILLICURIE: at 13:10

## 2024-11-12 NOTE — ED PROVIDER NOTES
OPHTHALMOLOGY OPERATIVE NOTE    MRN#: [id number]  NAME: Abdias Velasquez  DATE OF PROCEDURE: 11/12/2024    SURGEON:   Daly Camp DO     PRE-OPERATIVE DIAGNOSIS: Visually-significant senile combined cataract, left eye    POST-OPERATIVE DIAGNOSIS:  Same as pre-operative diagnosis.    NAME OF PROCEDURE:  Phacoemulsification of cataract with intraocular lens implantation (Fan [id number], 19.0 diopter, serial number [id number]), left eye.    TYPE OF ANESTHESIA:  Mac, Topical and intracameral    COMPLICATIONS:  None.    ESTIMATED BLOOD LOSS:  Less than 5 mL.    SPECIMENS:  None.    INDICATIONS:  The patient has a visually significant cataract, which interferes with activities of daily living. Informed consent was obtained from the patient, who agreed and wished to proceed with the surgery after all questions were answered.      TECHNICAL DESCRIPTION:    After explaining the risks and benefits of the proposed procedure to the patient and obtaining informed consent, the correct eye was marked and verified, and the patient was brought into the operating room where standard preoperative monitors were applied.  IV sedation as given and topical tetracaine was applied, after which the patient was prepped and draped in the usual sterile fashion for ophthalmic surgery.  A lid speculum was placed to separate the patient's eyelids. A paracentesis incision was made and nonpreserved 1% lidocaine was injected into the anterior chamber to enhance anesthesia. Amvisc was used to fill the anterior chamber. A 2.4-mm keratome was used to make a temporal clear corneal incision. A cystotome was used to initiate the anterior lens capsulorrhexis, which was completed to a continuous circular opening using Utrata forceps. Balanced salt solution was used to hydrodissect the lens nucleus from the capsule. The lens nucleus was removed using phacoemulsification.  Irrigation and aspiration were used to remove residual lens cortex material. The  **ADVANCED PRACTICE PROVIDER, I HAVE EVALUATED THIS PATIENT**        [address] ENCOUNTER      Pt Name: Marino Melendez  [facility]:[id number]  [place] 1970  Date of evaluation: 12/5/2022  Provider: FLACO Barrientos  Note Started: 12:09 PM EST 12/5/2022        Chief Complaint:    Chief Complaint   Patient presents with    Chest Pain     Mid-sternal chest pressure x3 weeks. Dizziness     Almost passed out. Intermittent left sided neck pain. Nursing Notes, Past Medical Hx, Past Surgical Hx, Social Hx, Allergies, and Family Hx were all reviewed and agreed with or any disagreements were addressed in the HPI.    HPI: (Location, Duration, Timing, Severity, Quality, Assoc Sx, Context, Modifying factors)    Chief Complaint of chest pain and dizziness. This is a  47 y.o. female who presents via private vehicle complaining of chest pain and left-sided neck and jaw. The patient states the pain is in the center of her chest, prior to today was not radiating. She states it is worse with exertion. She did see her cardiologist at [address] [place] [address] last week who recommended outpatient coronary CAT scan. She states beginning today the pain began radiating into the left side of her jaw and neck. She also had a near syncopal episodes today she became very lightheaded and felt like she was going to pass out. Currently rates pain as 6/10. She did have a stress test in April of this year which was normal.  Otherwise she denies any cardiac history. She also is complaining of nausea without vomiting. No abdominal pain, no dysuria or hematuria.     PastMedical/Surgical History:      Diagnosis Date    Acid reflux     Anxiety     IBS (irritable bowel syndrome)     PONV (postoperative nausea and vomiting)     Prolonged emergence from general anesthesia     Salmonella 06/10/2018    + gi cx salmonella species    Shoulder injury     chronic pain r/t shoulder injury    Unspecified cerebral artery occlusion with cerebral infarction     mild stroke after taking birth control         Procedure Laterality Date    COLONOSCOPY      COLONOSCOPY  6/29/15    normal    ENDOSCOPY, COLON, DIAGNOSTIC      HYSTERECTOMY (CERVIX STATUS UNKNOWN)      SHOULDER SURGERY Left     UPPER GASTROINTESTINAL ENDOSCOPY  6/29/15    normal       Medications:  Current Discharge Medication List        CONTINUE these medications which have NOT CHANGED    Details   timolol (TIMOPTIC) 0.25 % ophthalmic solution Place 1 drop into the right eye in the morning and 1 drop before bedtime. Qty: 1 each, Refills: 4      aspirin-acetaminophen-caffeine (EXCEDRIN EXTRA STRENGTH) 250-250-65 MG per tablet Take 1 tablet by mouth every 6 hours as needed for Headaches  Qty: 20 tablet, Refills: 0      metoprolol tartrate (LOPRESSOR) 25 MG tablet Take 25 mg by mouth daily      ondansetron (ZOFRAN) 4 MG tablet Take 1 tablet by mouth every 8 hours as needed for Nausea  Qty: 20 tablet, Refills: 0      LORazepam (ATIVAN) 0.5 MG tablet Take 0.5 mg by mouth as needed for Anxiety               Review of Systems:  (2-9 systems needed)  Review of Systems    \"Positives and Pertinent negatives as per HPI\"    Physical Exam:  Physical Exam  Vitals and nursing note reviewed. Constitutional:       Appearance: Normal appearance. She is not diaphoretic. HENT:      Head: Normocephalic and atraumatic. Nose: Nose normal.      Mouth/Throat:      Mouth: Mucous membranes are moist.   Eyes:      General:         Right eye: No discharge. Left eye: No discharge. Extraocular Movements: Extraocular movements intact. Pupils: Pupils are equal, round, and reactive to light. Cardiovascular:      Rate and Rhythm: Normal rate and regular rhythm. Pulses: Normal pulses. Heart sounds: Normal heart sounds. No murmur heard. No friction rub. No gallop. Pulmonary:      Effort: Pulmonary effort is normal. No respiratory distress.       Breath sounds: capsular bag was filled with Amvisc and the lens implant documented above was injected into the capsular bag.  The lens was centered and positioned with the irrigation and aspiration apparatus, which was also used to remove the remaining viscoelastic solution. The temporal corneal incision was then hydrated with balanced salt solution, and all incisions were tested and found to be watertight. 0.1 ml of Vigamox (preservative-free moxifloxacin 5 mg/ml) was instilled into the anterior chamber.  0.2 mg Kenalog was injected into the inferior conjunctiva. The lid speculum and drapes were removed. The eye was shielded. The patient tolerated the procedure well.    The patient was transferred to the recovery room in stable condition.    Normal breath sounds. No stridor. No wheezing, rhonchi or rales. Abdominal:      General: Abdomen is flat. Palpations: Abdomen is soft. Tenderness: There is no abdominal tenderness. There is no guarding or rebound. Musculoskeletal:         General: Normal range of motion. Cervical back: Normal range of motion and neck supple. Skin:     General: Skin is warm and dry. Coloration: Skin is not pale. Neurological:      Mental Status: She is alert and oriented to person, place, and time. Psychiatric:         Mood and Affect: Mood normal.         Behavior: Behavior normal.       MEDICAL DECISION MAKING    Vitals:    Vitals:    12/05/22 1615 12/05/22 1643 12/05/22 1739 12/05/22 1758   BP: 128/70 125/81 130/86    Pulse: 73 80 74    Resp: 13 14 18    Temp:  97.9 °F (36.6 °C) 98.6 °F (37 °C)    TempSrc:  Oral     SpO2: 98% 98% 97%    Weight:    145 lb 1.6 oz (65.8 kg)   Height:    5' (1.524 m)       LABS:  Labs Reviewed   COMPREHENSIVE METABOLIC PANEL W/ REFLEX TO MG FOR LOW K - Abnormal; Notable for the following components:       Result Value    Glucose 119 (*)     All other components within normal limits   CBC WITH AUTO DIFFERENTIAL   TROPONIN   TROPONIN   TROPONIN   CBC   LIPID PANEL        Remainder of labs reviewed and were negative at this time or not returned at the time of this note. RADIOLOGY:   Non-plain film images such as CT, Ultrasound and MRI are read by the radiologist. FLACO Agosto have directly visualized the radiologic plain film image(s) with the below findings:      Interpretation per the Radiologist below, if available at the time of this note:    CTA HEAD NECK W CONTRAST   Final Result   Unremarkable CTA of the head and neck. CT HEAD WO CONTRAST   Final Result   No acute intracranial abnormality. XR CHEST (2 VW)   Final Result   No acute process.          MRI BRAIN WO CONTRAST    (Results Pending)        XR CHEST (2 VW)    Result Date: 12/5/2022  EXAMINATION: TWO XRAY VIEWS OF THE CHEST 12/5/2022 11:51 am COMPARISON: 07/24/2022 HISTORY: ORDERING SYSTEM PROVIDED HISTORY: #ChestPain TECHNOLOGIST PROVIDED HISTORY: Reason for exam:->#ChestPain Reason for Exam: cp off/on for 2 weeks; dizziness off/on for 2 weeks FINDINGS: The lungs are without acute focal process. There is no effusion or pneumothorax. The cardiomediastinal silhouette is stable. The osseous structures are stable. No acute process. MEDICAL DECISION MAKING / ED COURSE:      Is this patient to be included in the SEP-1 Core Measure due to severe sepsis or septic shock? No   Exclusion criteria - the patient is NOT to be included for SEP-1 Core Measure due to:   Infection is not suspected      PROCEDURES:   Procedures    None    Patient was given:  Medications   aspirin-acetaminophen-caffeine (EXCEDRIN MIGRAINE) per tablet 1 tablet (has no administration in time range)   sodium chloride flush 0.9 % injection 5-40 mL (5 mLs IntraVENous Not Given 12/5/22 2004)   sodium chloride flush 0.9 % injection 5-40 mL (has no administration in time range)   0.9 % sodium chloride infusion (has no administration in time range)   ondansetron (ZOFRAN-ODT) disintegrating tablet 4 mg (has no administration in time range)     Or   ondansetron (ZOFRAN) injection 4 mg (has no administration in time range)   acetaminophen (TYLENOL) tablet 650 mg (650 mg Oral Given 12/5/22 1845)     Or   acetaminophen (TYLENOL) suppository 650 mg ( Rectal See Alternative 12/5/22 1845)   polyethylene glycol (GLYCOLAX) packet 17 g (has no administration in time range)   aspirin chewable tablet 81 mg (has no administration in time range)   atorvastatin (LIPITOR) tablet 40 mg (40 mg Oral Not Given 12/5/22 1931)   enoxaparin (LOVENOX) injection 40 mg (40 mg SubCUTAneous Not Given 12/5/22 1741)   perflutren lipid microspheres (DEFINITY) injection 1.5 mL (has no administration in time range)   timolol (TIMOPTIC) 0.25 % ophthalmic solution 1 drop (1 drop Both Eyes Given 12/5/22 2003)   iopamidol (ISOVUE-370) 76 % injection 75 mL (75 mLs IntraVENous Given 12/5/22 1237)   aspirin tablet 325 mg (325 mg Oral Given 12/5/22 8203)       Patient presents to the emergency department today complaining of chest pain and dizziness. Patient was evaluated in the emergency department by myself and attending physician who agreed with all work-up, treatment, and disposition decisions. She had a near syncopal episode today. Her vital signs are stable in the emergency department. Physical exam is reassuring. Work-up results include:  CBC without evidence of leukocytosis or acute anemia  CMP without acute electrolyte abnormality maintain renal function  Troponin is less than 0.01  CTA head and neck and CT head without contrast is obtained given the patient's dizziness. Imaging results show no acute intracranial abnormality. Chest x-ray shows no acute process. EKG interpreted by attending physician found abnormal sinus rhythm. A discussion was had with the patient regarding all lab and imaging results. At this time I am recommending admission for ACS work-up. The patient is in agreement treatment plan. Hospitalist is consulted at this time. The patient tolerated their visit well. I evaluated the patient. The physician was available for consultation as needed. The patient and / or the family were informed of the results of any tests, a time was given to answer questions, a plan was proposed and they agreed with plan. I am the Primary Clinician of Record. CLINICAL IMPRESSION:  1. Chest pain, unspecified type    2.  Dizziness      Results for orders placed or performed during the hospital encounter of 12/05/22   CBC with Auto Differential   Result Value Ref Range    WBC 4.9 4.0 - 11.0 K/uL    RBC 4.51 4.00 - 5.20 M/uL    Hemoglobin 14.6 12.0 - 16.0 g/dL    Hematocrit 43.1 36.0 - 48.0 %    MCV 95.6 80.0 - 100.0 fL    MCH 32.3 26.0 - 34.0 pg    MCHC 33.8 31.0 - 36.0 g/dL    RDW 12.8 12.4 - 15.4 %    Platelets 061 796 - 447 K/uL    MPV 8.3 5.0 - 10.5 fL    Neutrophils % 56.6 %    Lymphocytes % 35.8 %    Monocytes % 5.4 %    Eosinophils % 1.4 %    Basophils % 0.8 %    Neutrophils Absolute 2.8 1.7 - 7.7 K/uL    Lymphocytes Absolute 1.8 1.0 - 5.1 K/uL    Monocytes Absolute 0.3 0.0 - 1.3 K/uL    Eosinophils Absolute 0.1 0.0 - 0.6 K/uL    Basophils Absolute 0.0 0.0 - 0.2 K/uL   Comprehensive Metabolic Panel w/ Reflex to MG   Result Value Ref Range    Sodium 140 136 - 145 mmol/L    Potassium reflex Magnesium 3.7 3.5 - 5.1 mmol/L    Chloride 103 99 - 110 mmol/L    CO2 25 21 - 32 mmol/L    Anion Gap 12 3 - 16    Glucose 119 (H) 70 - 99 mg/dL    BUN 14 7 - 20 mg/dL    Creatinine 0.7 0.6 - 1.1 mg/dL    Est, Glom Filt Rate >60 >60    Calcium 9.6 8.3 - 10.6 mg/dL    Total Protein 7.2 6.4 - 8.2 g/dL    Albumin 4.8 3.4 - 5.0 g/dL    Albumin/Globulin Ratio 2.0 1.1 - 2.2    Total Bilirubin 0.4 0.0 - 1.0 mg/dL    Alkaline Phosphatase 64 40 - 129 U/L    ALT 20 10 - 40 U/L    AST 21 15 - 37 U/L   Troponin   Result Value Ref Range    Troponin <0.01 <0.01 ng/mL   EKG 12 Lead   Result Value Ref Range    Ventricular Rate 87 BPM    Atrial Rate 87 BPM    P-R Interval 156 ms    QRS Duration 78 ms    Q-T Interval 364 ms    QTc Calculation (Bazett) 438 ms    P Axis 27 degrees    R Axis 31 degrees    T Axis 38 degrees    Diagnosis       Normal sinus rhythmNormal ECGWhen compared with ECG of 24-JUL-2022 11:09,No significant change was foundConfirmed by Jolanta Patiño ([id number]) on 12/5/2022 5:14:57 PM       I spoke with  [facility] hospitalist. We discussed the history, physical exam, diagnostics, as well as their emergency department course. Based upon that discussion, we've decided to admit Patricia Holt for further observation and evaluation of Rosette Judge's   1. Chest pain, unspecified type    2. Dizziness    .       DISPOSITION Admitted 12/05/2022 02:22:19 PM      PATIENT REFERRED TO:  No follow-up provider specified.     DISCHARGE MEDICATIONS:  Current Discharge Medication List          DISCONTINUED MEDICATIONS:  Current Discharge Medication List        STOP taking these medications       vilazodone HCl (VIIBRYD) 10 MG TABS Comments:   Reason for Stopping:                      (Please note the MDM and HPI sections of this note were completed with a voice recognition program.  Efforts were made to edit the dictations but occasionally words are mis-transcribed.)    Electronically signed, FLACO Burnham,           Vernell Burnham  12/05/22 2022

## 2024-11-13 ENCOUNTER — HOSPITAL ENCOUNTER (EMERGENCY)
Age: 54
Discharge: HOME | End: 2024-11-13
Attending: EMERGENCY MEDICINE
Payer: COMMERCIAL

## 2024-11-13 ENCOUNTER — APPOINTMENT (OUTPATIENT)
Dept: CT IMAGING | Age: 54
End: 2024-11-13
Payer: COMMERCIAL

## 2024-11-13 VITALS
TEMPERATURE: 98.3 F | HEIGHT: 60 IN | HEART RATE: 68 BPM | RESPIRATION RATE: 16 BRPM | OXYGEN SATURATION: 97 % | SYSTOLIC BLOOD PRESSURE: 134 MMHG | DIASTOLIC BLOOD PRESSURE: 68 MMHG | WEIGHT: 148.4 LBS | BODY MASS INDEX: 29.13 KG/M2

## 2024-11-13 DIAGNOSIS — K52.9 GASTROENTERITIS: Primary | ICD-10-CM

## 2024-11-13 DIAGNOSIS — R10.31 RIGHT LOWER QUADRANT ABDOMINAL PAIN: ICD-10-CM

## 2024-11-13 LAB
ALBUMIN SERPL-MCNC: 4.2 G/DL (ref 3.4–5)
ALP SERPL-CCNC: 63 U/L (ref 40–129)
ALT SERPL-CCNC: 16 U/L (ref 10–40)
ANION GAP SERPL CALCULATED.3IONS-SCNC: 9 MMOL/L (ref 3–16)
AST SERPL-CCNC: 22 U/L (ref 15–37)
BASOPHILS # BLD: 0 K/UL (ref 0–0.2)
BASOPHILS NFR BLD: 0.7 %
BILIRUB DIRECT SERPL-MCNC: 0.2 MG/DL (ref 0–0.3)
BILIRUB INDIRECT SERPL-MCNC: 0.2 MG/DL (ref 0–1)
BILIRUB SERPL-MCNC: 0.4 MG/DL (ref 0–1)
BILIRUB UR QL STRIP.AUTO: NEGATIVE
BUN SERPL-MCNC: 9 MG/DL (ref 7–20)
CALCIUM SERPL-MCNC: 9.1 MG/DL (ref 8.3–10.6)
CHLORIDE SERPL-SCNC: 105 MMOL/L (ref 99–110)
CLARITY UR: CLEAR
CO2 SERPL-SCNC: 29 MMOL/L (ref 21–32)
COLOR UR: YELLOW
CREAT SERPL-MCNC: 0.6 MG/DL (ref 0.6–1.1)
DEPRECATED RDW RBC AUTO: 13 % (ref 12.4–15.4)
EOSINOPHIL # BLD: 0 K/UL (ref 0–0.6)
EOSINOPHIL NFR BLD: 0.6 %
GFR SERPLBLD CREATININE-BSD FMLA CKD-EPI: >90 ML/MIN/{1.73_M2}
GLUCOSE SERPL-MCNC: 87 MG/DL (ref 70–99)
GLUCOSE UR STRIP.AUTO-MCNC: NEGATIVE MG/DL
HCT VFR BLD AUTO: 41.3 % (ref 36–48)
HGB BLD-MCNC: 14.3 G/DL (ref 12–16)
HGB UR QL STRIP.AUTO: NEGATIVE
KETONES UR STRIP.AUTO-MCNC: NEGATIVE MG/DL
LEUKOCYTE ESTERASE UR QL STRIP.AUTO: NEGATIVE
LIPASE SERPL-CCNC: 37 U/L (ref 13–60)
LYMPHOCYTES # BLD: 1.4 K/UL (ref 1–5.1)
LYMPHOCYTES NFR BLD: 32.1 %
MCH RBC QN AUTO: 32.9 PG (ref 26–34)
MCHC RBC AUTO-ENTMCNC: 34.8 G/DL (ref 31–36)
MCV RBC AUTO: 94.8 FL (ref 80–100)
MONOCYTES # BLD: 0.3 K/UL (ref 0–1.3)
MONOCYTES NFR BLD: 6.7 %
NEUTROPHILS # BLD: 2.7 K/UL (ref 1.7–7.7)
NEUTROPHILS NFR BLD: 59.9 %
NITRITE UR QL STRIP.AUTO: NEGATIVE
PH UR STRIP.AUTO: 6.5 [PH] (ref 5–8)
PLATELET # BLD AUTO: 260 K/UL (ref 135–450)
PMV BLD AUTO: 8.6 FL (ref 5–10.5)
POTASSIUM SERPL-SCNC: 4.3 MMOL/L (ref 3.5–5.1)
PROT SERPL-MCNC: 6.7 G/DL (ref 6.4–8.2)
PROT UR STRIP.AUTO-MCNC: NEGATIVE MG/DL
RBC # BLD AUTO: 4.35 M/UL (ref 4–5.2)
SODIUM SERPL-SCNC: 143 MMOL/L (ref 136–145)
SP GR UR STRIP.AUTO: 1.01 (ref 1–1.03)
UA COMPLETE W REFLEX CULTURE PNL UR: NORMAL
UA DIPSTICK W REFLEX MICRO PNL UR: NORMAL
URN SPEC COLLECT METH UR: NORMAL
UROBILINOGEN UR STRIP-ACNC: 0.2 E.U./DL
WBC # BLD AUTO: 4.5 K/UL (ref 4–11)

## 2024-11-13 PROCEDURE — 74177 CT ABD & PELVIS W/CONTRAST: CPT

## 2024-11-13 PROCEDURE — 99285 EMERGENCY DEPT VISIT HI MDM: CPT

## 2024-11-13 PROCEDURE — 6360000004 HC RX CONTRAST MEDICATION: Performed by: EMERGENCY MEDICINE

## 2024-11-13 PROCEDURE — 81003 URINALYSIS AUTO W/O SCOPE: CPT

## 2024-11-13 PROCEDURE — 96374 THER/PROPH/DIAG INJ IV PUSH: CPT

## 2024-11-13 PROCEDURE — 80048 BASIC METABOLIC PNL TOTAL CA: CPT

## 2024-11-13 PROCEDURE — 6360000002 HC RX W HCPCS: Performed by: EMERGENCY MEDICINE

## 2024-11-13 PROCEDURE — 96375 TX/PRO/DX INJ NEW DRUG ADDON: CPT

## 2024-11-13 PROCEDURE — 80076 HEPATIC FUNCTION PANEL: CPT

## 2024-11-13 PROCEDURE — 83690 ASSAY OF LIPASE: CPT

## 2024-11-13 PROCEDURE — 85025 COMPLETE CBC W/AUTO DIFF WBC: CPT

## 2024-11-13 RX ORDER — ONDANSETRON 2 MG/ML
4 INJECTION INTRAMUSCULAR; INTRAVENOUS ONCE
Status: COMPLETED | OUTPATIENT
Start: 2024-11-13 | End: 2024-11-13

## 2024-11-13 RX ORDER — KETOROLAC TROMETHAMINE 15 MG/ML
15 INJECTION, SOLUTION INTRAMUSCULAR; INTRAVENOUS ONCE
Status: COMPLETED | OUTPATIENT
Start: 2024-11-13 | End: 2024-11-13

## 2024-11-13 RX ORDER — IOPAMIDOL 755 MG/ML
75 INJECTION, SOLUTION INTRAVASCULAR
Status: COMPLETED | OUTPATIENT
Start: 2024-11-13 | End: 2024-11-13

## 2024-11-13 RX ORDER — ONDANSETRON 4 MG/1
4 TABLET, FILM COATED ORAL 3 TIMES DAILY PRN
Qty: 15 TABLET | Refills: 0 | Status: SHIPPED | OUTPATIENT
Start: 2024-11-13

## 2024-11-13 RX ORDER — IBUPROFEN 600 MG/1
600 TABLET, FILM COATED ORAL 3 TIMES DAILY PRN
Qty: 30 TABLET | Refills: 0 | Status: SHIPPED | OUTPATIENT
Start: 2024-11-13

## 2024-11-13 RX ADMIN — KETOROLAC TROMETHAMINE 15 MG: 15 INJECTION, SOLUTION INTRAMUSCULAR; INTRAVENOUS at 12:07

## 2024-11-13 RX ADMIN — ONDANSETRON 4 MG: 2 INJECTION INTRAMUSCULAR; INTRAVENOUS at 12:08

## 2024-11-13 RX ADMIN — IOPAMIDOL 75 ML: 755 INJECTION, SOLUTION INTRAVENOUS at 12:51

## 2024-11-13 ASSESSMENT — PAIN - FUNCTIONAL ASSESSMENT
PAIN_FUNCTIONAL_ASSESSMENT: 0-10
PAIN_FUNCTIONAL_ASSESSMENT: 0-10

## 2024-11-13 ASSESSMENT — PAIN SCALES - GENERAL
PAINLEVEL_OUTOF10: 3
PAINLEVEL_OUTOF10: 3
PAINLEVEL_OUTOF10: 0

## 2024-11-13 ASSESSMENT — PAIN DESCRIPTION - LOCATION: LOCATION: ABDOMEN

## 2024-11-13 ASSESSMENT — ENCOUNTER SYMPTOMS
SHORTNESS OF BREATH: 0
ABDOMINAL PAIN: 1
DIARRHEA: 0
NAUSEA: 1
CONSTIPATION: 0
VOMITING: 0
EYE PAIN: 0
RHINORRHEA: 0
EYE REDNESS: 0
COUGH: 0
BACK PAIN: 0

## 2024-11-13 ASSESSMENT — PAIN DESCRIPTION - FREQUENCY: FREQUENCY: INTERMITTENT

## 2024-11-13 ASSESSMENT — PAIN DESCRIPTION - DESCRIPTORS: DESCRIPTORS: SHARP

## 2024-11-13 ASSESSMENT — PAIN DESCRIPTION - PAIN TYPE: TYPE: ACUTE PAIN

## 2024-11-13 NOTE — ED PROVIDER NOTES
Respirations: 16, SpO2: 98 %   RECENT VITALS:  BP: 134/68,Temp: 98.3 °F (36.8 °C), Pulse: 68, Respirations: 16, SpO2: 97 %     Is this patient to be included in the SEP-1 Core Measure due to severe sepsis or septic shock?   No   Exclusion criteria - the patient is NOT to be included for SEP-1 Core Measure due to:  2+ SIRS criteria are not met  CC/HPI Summary, DDx, ED Course, and Reassessment:   Rosette Judge is a 54 y.o. female who presents to the emergency department secondary to concern for symptoms as noted in HPI above.     On presentation she is in no acute distress.  Right lower quadrant abdominal tenderness present without evidence of guarding or rigidity.  Patient given IV Toradol and Zofran for symptomatic relief.  Lab workup was essentially unremarkable; no anemia, leukocytosis or significant electrolyte abnormalities.  Urinalysis negative for infection or hematuria.  No evidence of acute kidney injury.  CT abdomen/pelvis negative for acute process and specifically identified a normal appendix.  Patient notified of lab and imaging results and was given reassurance.  Her pain significantly improved after treatment and reexamination.  Suspect that her symptoms are secondary to gastroenteritis. She was advised to continue supportive care at home and to follow with primary care physician. She was also given strict ED return precautions. Patient stable for discharge at this time.    A peripheral IV was placed, labs were ordered.    After discussion of results, diagnosis, and symptomatic care, I reiterated return precautions and importance of follow-up.   She Patient expressed understanding of all instruction. The patient was in agreement with plan, and all questions were answered. She was discharged home in stable condition.          Disposition Considerations (tests considered but not done, Shared Decision Making, Pt Expectation of Test or Tx.): Appropriate for outpatient management    I estimate there is

## 2025-05-02 ENCOUNTER — APPOINTMENT (OUTPATIENT)
Age: 55
DRG: 310 | End: 2025-05-02
Attending: INTERNAL MEDICINE
Payer: COMMERCIAL

## 2025-05-02 ENCOUNTER — APPOINTMENT (OUTPATIENT)
Dept: GENERAL RADIOLOGY | Age: 55
DRG: 310 | End: 2025-05-02
Payer: COMMERCIAL

## 2025-05-02 ENCOUNTER — HOSPITAL ENCOUNTER (INPATIENT)
Age: 55
LOS: 2 days | Discharge: HOME | DRG: 310 | End: 2025-05-04
Attending: STUDENT IN AN ORGANIZED HEALTH CARE EDUCATION/TRAINING PROGRAM | Admitting: INTERNAL MEDICINE
Payer: COMMERCIAL

## 2025-05-02 DIAGNOSIS — R55 NEAR SYNCOPE: Primary | ICD-10-CM

## 2025-05-02 DIAGNOSIS — R00.2 PALPITATIONS: ICD-10-CM

## 2025-05-02 DIAGNOSIS — R07.9 CHEST PAIN, UNSPECIFIED TYPE: ICD-10-CM

## 2025-05-02 LAB
ALBUMIN SERPL-MCNC: 5 G/DL (ref 3.4–5)
ALBUMIN/GLOB SERPL: 2.2 {RATIO} (ref 1.1–2.2)
ALP SERPL-CCNC: 57 U/L (ref 40–129)
ALT SERPL-CCNC: 24 U/L (ref 10–40)
ANION GAP SERPL CALCULATED.3IONS-SCNC: 14 MMOL/L (ref 3–16)
AST SERPL-CCNC: 28 U/L (ref 15–37)
B-HCG SERPL EIA 3RD IS-ACNC: <5 MIU/ML
BASOPHILS # BLD: 0 K/UL (ref 0–0.2)
BASOPHILS NFR BLD: 0.7 %
BILIRUB SERPL-MCNC: 0.4 MG/DL (ref 0–1)
BUN SERPL-MCNC: 11 MG/DL (ref 7–20)
CALCIUM SERPL-MCNC: 9.8 MG/DL (ref 8.3–10.6)
CHLORIDE SERPL-SCNC: 101 MMOL/L (ref 99–110)
CO2 SERPL-SCNC: 23 MMOL/L (ref 21–32)
CREAT SERPL-MCNC: 0.7 MG/DL (ref 0.6–1.1)
DEPRECATED RDW RBC AUTO: 13.1 % (ref 12.4–15.4)
ECHO AO ASC DIAM: 2.7 CM
ECHO AO ASCENDING AORTA INDEX: 1.68 CM/M2
ECHO AV MEAN GRADIENT: 4 MMHG
ECHO AV MEAN VELOCITY: 0.9 M/S
ECHO AV PEAK GRADIENT: 7 MMHG
ECHO AV PEAK VELOCITY: 1.3 M/S
ECHO AV VELOCITY RATIO: 0.77
ECHO AV VTI: 24.3 CM
ECHO BSA: 1.65 M2
ECHO EST RA PRESSURE: 3 MMHG
ECHO IVC PROX: 1.2 CM
ECHO LA AREA 2C: 12.8 CM2
ECHO LA AREA 4C: 12.1 CM2
ECHO LA MAJOR AXIS: 4.2 CM
ECHO LA MINOR AXIS: 4.5 CM
ECHO LA VOL BP: 29 ML (ref 22–52)
ECHO LA VOL MOD A2C: 30 ML (ref 22–52)
ECHO LA VOL MOD A4C: 28 ML (ref 22–52)
ECHO LA VOL/BSA BIPLANE: 18 ML/M2 (ref 16–34)
ECHO LA VOLUME INDEX MOD A2C: 19 ML/M2 (ref 16–34)
ECHO LA VOLUME INDEX MOD A4C: 17 ML/M2 (ref 16–34)
ECHO LV E' LATERAL VELOCITY: 13.8 CM/S
ECHO LV E' SEPTAL VELOCITY: 9.57 CM/S
ECHO LV EDV A2C: 72 ML
ECHO LV EDV A4C: 73 ML
ECHO LV EDV INDEX A4C: 45 ML/M2
ECHO LV EDV NDEX A2C: 45 ML/M2
ECHO LV EF PHYSICIAN: 60 %
ECHO LV EJECTION FRACTION A2C: 58 %
ECHO LV EJECTION FRACTION A4C: 60 %
ECHO LV EJECTION FRACTION BIPLANE: 60 % (ref 55–100)
ECHO LV ESV A2C: 30 ML
ECHO LV ESV A4C: 29 ML
ECHO LV ESV INDEX A2C: 19 ML/M2
ECHO LV ESV INDEX A4C: 18 ML/M2
ECHO LV FRACTIONAL SHORTENING: 33 % (ref 28–44)
ECHO LV INTERNAL DIMENSION DIASTOLE INDEX: 2.67 CM/M2
ECHO LV INTERNAL DIMENSION DIASTOLIC: 4.3 CM (ref 3.9–5.3)
ECHO LV INTERNAL DIMENSION SYSTOLIC INDEX: 1.8 CM/M2
ECHO LV INTERNAL DIMENSION SYSTOLIC: 2.9 CM
ECHO LV IVSD: 0.8 CM (ref 0.6–0.9)
ECHO LV MASS 2D: 96.8 G (ref 67–162)
ECHO LV MASS INDEX 2D: 60.1 G/M2 (ref 43–95)
ECHO LV POSTERIOR WALL DIASTOLIC: 0.7 CM (ref 0.6–0.9)
ECHO LV RELATIVE WALL THICKNESS RATIO: 0.33
ECHO LVOT AV VTI INDEX: 0.82
ECHO LVOT MEAN GRADIENT: 2 MMHG
ECHO LVOT PEAK GRADIENT: 4 MMHG
ECHO LVOT PEAK VELOCITY: 1 M/S
ECHO LVOT VTI: 19.9 CM
ECHO MV A VELOCITY: 0.62 M/S
ECHO MV E DECELERATION TIME (DT): 111 MS
ECHO MV E VELOCITY: 0.58 M/S
ECHO MV E/A RATIO: 0.94
ECHO MV E/E' LATERAL: 4.2
ECHO MV E/E' RATIO (AVERAGED): 5.13
ECHO MV E/E' SEPTAL: 6.06
ECHO RA AREA 4C: 9.1 CM2
ECHO RA END SYSTOLIC VOLUME APICAL 4 CHAMBER INDEX BSA: 8 ML/M2
ECHO RA VOLUME: 13 ML
ECHO RV BASAL DIMENSION: 2.1 CM
ECHO RV FREE WALL PEAK S': 12.1 CM/S
ECHO RV LONGITUDINAL DIMENSION: 6.4 CM
ECHO RV MID DIMENSION: 1.3 CM
ECHO RV TAPSE: 2.9 CM (ref 1.7–?)
EKG ATRIAL RATE: 80 BPM
EKG DIAGNOSIS: NORMAL
EKG P AXIS: 22 DEGREES
EKG P-R INTERVAL: 160 MS
EKG Q-T INTERVAL: 384 MS
EKG QRS DURATION: 80 MS
EKG QTC CALCULATION (BAZETT): 442 MS
EKG R AXIS: 16 DEGREES
EKG T AXIS: 27 DEGREES
EKG VENTRICULAR RATE: 80 BPM
EOSINOPHIL # BLD: 0.1 K/UL (ref 0–0.6)
EOSINOPHIL NFR BLD: 1.3 %
GFR SERPLBLD CREATININE-BSD FMLA CKD-EPI: >90 ML/MIN/{1.73_M2}
GLUCOSE SERPL-MCNC: 110 MG/DL (ref 70–99)
HCT VFR BLD AUTO: 43.3 % (ref 36–48)
HGB BLD-MCNC: 14.8 G/DL (ref 12–16)
LIPASE SERPL-CCNC: 59 U/L (ref 13–60)
LYMPHOCYTES # BLD: 2.5 K/UL (ref 1–5.1)
LYMPHOCYTES NFR BLD: 45.3 %
MAGNESIUM SERPL-MCNC: 1.78 MG/DL (ref 1.8–2.4)
MCH RBC QN AUTO: 33.1 PG (ref 26–34)
MCHC RBC AUTO-ENTMCNC: 34.2 G/DL (ref 31–36)
MCV RBC AUTO: 96.6 FL (ref 80–100)
MONOCYTES # BLD: 0.4 K/UL (ref 0–1.3)
MONOCYTES NFR BLD: 6.7 %
NEUTROPHILS # BLD: 2.6 K/UL (ref 1.7–7.7)
NEUTROPHILS NFR BLD: 46 %
NT-PROBNP SERPL-MCNC: <36 PG/ML (ref 0–124)
PLATELET # BLD AUTO: 268 K/UL (ref 135–450)
PMV BLD AUTO: 8.9 FL (ref 5–10.5)
POTASSIUM SERPL-SCNC: 3.4 MMOL/L (ref 3.5–5.1)
PROT SERPL-MCNC: 7.3 G/DL (ref 6.4–8.2)
RBC # BLD AUTO: 4.48 M/UL (ref 4–5.2)
SODIUM SERPL-SCNC: 138 MMOL/L (ref 136–145)
TROPONIN, HIGH SENSITIVITY: 7 NG/L (ref 0–14)
TROPONIN, HIGH SENSITIVITY: <6 NG/L (ref 0–14)
TROPONIN, HIGH SENSITIVITY: <6 NG/L (ref 0–14)
WBC # BLD AUTO: 5.6 K/UL (ref 4–11)

## 2025-05-02 PROCEDURE — 2500000003 HC RX 250 WO HCPCS: Performed by: INTERNAL MEDICINE

## 2025-05-02 PROCEDURE — 93306 TTE W/DOPPLER COMPLETE: CPT

## 2025-05-02 PROCEDURE — 93005 ELECTROCARDIOGRAM TRACING: CPT | Performed by: STUDENT IN AN ORGANIZED HEALTH CARE EDUCATION/TRAINING PROGRAM

## 2025-05-02 PROCEDURE — 99285 EMERGENCY DEPT VISIT HI MDM: CPT

## 2025-05-02 PROCEDURE — 36415 COLL VENOUS BLD VENIPUNCTURE: CPT

## 2025-05-02 PROCEDURE — 6370000000 HC RX 637 (ALT 250 FOR IP): Performed by: INTERNAL MEDICINE

## 2025-05-02 PROCEDURE — 6360000002 HC RX W HCPCS: Performed by: INTERNAL MEDICINE

## 2025-05-02 PROCEDURE — 1200000000 HC SEMI PRIVATE

## 2025-05-02 PROCEDURE — 80053 COMPREHEN METABOLIC PANEL: CPT

## 2025-05-02 PROCEDURE — 71045 X-RAY EXAM CHEST 1 VIEW: CPT

## 2025-05-02 PROCEDURE — 84484 ASSAY OF TROPONIN QUANT: CPT

## 2025-05-02 PROCEDURE — 83880 ASSAY OF NATRIURETIC PEPTIDE: CPT

## 2025-05-02 PROCEDURE — 93306 TTE W/DOPPLER COMPLETE: CPT | Performed by: INTERNAL MEDICINE

## 2025-05-02 PROCEDURE — 85025 COMPLETE CBC W/AUTO DIFF WBC: CPT

## 2025-05-02 PROCEDURE — 83735 ASSAY OF MAGNESIUM: CPT

## 2025-05-02 PROCEDURE — 93010 ELECTROCARDIOGRAM REPORT: CPT | Performed by: INTERNAL MEDICINE

## 2025-05-02 PROCEDURE — 83690 ASSAY OF LIPASE: CPT

## 2025-05-02 PROCEDURE — 6370000000 HC RX 637 (ALT 250 FOR IP): Performed by: STUDENT IN AN ORGANIZED HEALTH CARE EDUCATION/TRAINING PROGRAM

## 2025-05-02 PROCEDURE — 84702 CHORIONIC GONADOTROPIN TEST: CPT

## 2025-05-02 RX ORDER — SODIUM CHLORIDE 0.9 % (FLUSH) 0.9 %
5-40 SYRINGE (ML) INJECTION PRN
Status: DISCONTINUED | OUTPATIENT
Start: 2025-05-02 | End: 2025-05-04 | Stop reason: HOSPADM

## 2025-05-02 RX ORDER — MAGNESIUM SULFATE IN WATER 40 MG/ML
2000 INJECTION, SOLUTION INTRAVENOUS ONCE
Status: COMPLETED | OUTPATIENT
Start: 2025-05-02 | End: 2025-05-02

## 2025-05-02 RX ORDER — METOPROLOL TARTRATE 25 MG/1
25 TABLET, FILM COATED ORAL DAILY
Status: DISCONTINUED | OUTPATIENT
Start: 2025-05-03 | End: 2025-05-04 | Stop reason: HOSPADM

## 2025-05-02 RX ORDER — ACETAMINOPHEN 650 MG/1
650 SUPPOSITORY RECTAL EVERY 6 HOURS PRN
Status: DISCONTINUED | OUTPATIENT
Start: 2025-05-02 | End: 2025-05-04 | Stop reason: HOSPADM

## 2025-05-02 RX ORDER — ACETAMINOPHEN 325 MG/1
650 TABLET ORAL EVERY 6 HOURS PRN
Status: DISCONTINUED | OUTPATIENT
Start: 2025-05-02 | End: 2025-05-04 | Stop reason: HOSPADM

## 2025-05-02 RX ORDER — REGADENOSON 0.08 MG/ML
0.4 INJECTION, SOLUTION INTRAVENOUS
Status: COMPLETED | OUTPATIENT
Start: 2025-05-02 | End: 2025-05-03

## 2025-05-02 RX ORDER — POTASSIUM CHLORIDE 1500 MG/1
40 TABLET, EXTENDED RELEASE ORAL ONCE
Status: COMPLETED | OUTPATIENT
Start: 2025-05-02 | End: 2025-05-02

## 2025-05-02 RX ORDER — ONDANSETRON 2 MG/ML
4 INJECTION INTRAMUSCULAR; INTRAVENOUS EVERY 6 HOURS PRN
Status: DISCONTINUED | OUTPATIENT
Start: 2025-05-02 | End: 2025-05-04 | Stop reason: HOSPADM

## 2025-05-02 RX ORDER — ASPIRIN 81 MG/1
81 TABLET, CHEWABLE ORAL DAILY
Status: DISCONTINUED | OUTPATIENT
Start: 2025-05-03 | End: 2025-05-04 | Stop reason: HOSPADM

## 2025-05-02 RX ORDER — POLYETHYLENE GLYCOL 3350 17 G/17G
17 POWDER, FOR SOLUTION ORAL DAILY PRN
Status: DISCONTINUED | OUTPATIENT
Start: 2025-05-02 | End: 2025-05-04 | Stop reason: HOSPADM

## 2025-05-02 RX ORDER — ENOXAPARIN SODIUM 100 MG/ML
40 INJECTION SUBCUTANEOUS DAILY
Status: DISCONTINUED | OUTPATIENT
Start: 2025-05-02 | End: 2025-05-04 | Stop reason: HOSPADM

## 2025-05-02 RX ORDER — SODIUM CHLORIDE 0.9 % (FLUSH) 0.9 %
5-40 SYRINGE (ML) INJECTION EVERY 12 HOURS SCHEDULED
Status: DISCONTINUED | OUTPATIENT
Start: 2025-05-02 | End: 2025-05-04 | Stop reason: HOSPADM

## 2025-05-02 RX ORDER — ONDANSETRON 4 MG/1
4 TABLET, ORALLY DISINTEGRATING ORAL EVERY 8 HOURS PRN
Status: DISCONTINUED | OUTPATIENT
Start: 2025-05-02 | End: 2025-05-04 | Stop reason: HOSPADM

## 2025-05-02 RX ORDER — SODIUM CHLORIDE 9 MG/ML
INJECTION, SOLUTION INTRAVENOUS PRN
Status: DISCONTINUED | OUTPATIENT
Start: 2025-05-02 | End: 2025-05-04 | Stop reason: HOSPADM

## 2025-05-02 RX ADMIN — MAGNESIUM SULFATE HEPTAHYDRATE 2000 MG: 40 INJECTION, SOLUTION INTRAVENOUS at 15:40

## 2025-05-02 RX ADMIN — ACETAMINOPHEN 650 MG: 325 TABLET ORAL at 19:59

## 2025-05-02 RX ADMIN — SODIUM CHLORIDE, PRESERVATIVE FREE 10 ML: 5 INJECTION INTRAVENOUS at 20:00

## 2025-05-02 RX ADMIN — POTASSIUM CHLORIDE 40 MEQ: 1500 TABLET, EXTENDED RELEASE ORAL at 13:52

## 2025-05-02 ASSESSMENT — PAIN SCALES - GENERAL
PAINLEVEL_OUTOF10: 0
PAINLEVEL_OUTOF10: 4

## 2025-05-02 ASSESSMENT — LIFESTYLE VARIABLES
HOW OFTEN DO YOU HAVE A DRINK CONTAINING ALCOHOL: MONTHLY OR LESS
HOW MANY STANDARD DRINKS CONTAINING ALCOHOL DO YOU HAVE ON A TYPICAL DAY: 1 OR 2

## 2025-05-02 ASSESSMENT — PAIN DESCRIPTION - LOCATION: LOCATION: HEAD

## 2025-05-02 ASSESSMENT — PAIN DESCRIPTION - DESCRIPTORS: DESCRIPTORS: ACHING

## 2025-05-02 ASSESSMENT — PAIN - FUNCTIONAL ASSESSMENT: PAIN_FUNCTIONAL_ASSESSMENT: NONE - DENIES PAIN

## 2025-05-02 NOTE — H&P
will follow        Discussed management and the need for Hospitalization of the patient w/ the Emergency Department Provider    CXR: I have reviewed the CXR with the following interpretation: No acute abnormality  EKG:  I have reviewed the EKG with the following interpretation: Normal sinus rhythm with no acute ST-T wave change    Physical Exam Performed:      /73   Pulse 78   Temp 97 °F (36.1 °C) (Oral)   Resp 17   Ht 1.524 m (5')   Wt 64.5 kg (142 lb 3.2 oz)   SpO2 98%   BMI 27.77 kg/m²     General appearance: She is lying in bed, in no acute distress she does look weak and tired is alert and cooperative.  HEENT:  Pupils equal, round, and reactive to light. Conjunctivae/corneas clear.  Respiratory:  Normal respiratory effort. Clear to auscultation bilaterally without Rales/Wheezes/Rhonchi.  Cardiovascular:  Regular rate and rhythm with normal S1/S2   Abdomen:  Soft, non-tender, non-distended with + bowel sounds.  Musculoskeletal:  No clubbing, cyanosis or edema bilaterally.  Full range of motion without deformity.  Neurologic:  Neurovascularly intact without any focal sensory/motor deficits. Cranial nerves: II-XII intact, grossly non-focal.  Psychiatric:  Alert and oriented, thought content appropriate, normal insight.  Peripheral Pulses:  +2 palpable, equal bilaterally     Diet: [x]Adult/General  []Cardiac  []Diabetic  []Low Fat  []NPO  []NPO after Midnight  []Other     DVT Prophylaxis: PPX dose LMWH    Code status: []Full  []DNR/CCA  []Limited (DNR/CCA with Do Not Intubate)  []DNRCC  Surrogate Decision Maker:   Name Home Phone Work Phone Mobile Phone Relationship Lgl Grd   WEEKSJENNIFER ORTIZ [phone] Spouse No   COCO WEEKS [phone] Child No        PT/OT Eval Status: Not yet ordered    Anticipated Discharge Day/Date:  2-3 days     Anticipated Discharge Location: Home    Consults:      IP CONSULT TO CARDIOLOGY    I personally have obtained, updated and/or reviewed

## 2025-05-02 NOTE — ED NOTES
Rosette Judge is a 54 y.o. female admitted for  Principal Problem:    Chest pain  Resolved Problems:    * No resolved hospital problems. *  .   Patient Home via self with   Chief Complaint   Patient presents with    Palpitations     Pt states for the past couple days her heart has been beating really fast. Pt states she feels off. Pt states it is worse after she eats. Pt had scan on her gallbladder checked on Friday, but the surgeon told her they don't want to take it out yet until she gets another scan. Denies cardiac history     .  Patient is alert and Person, Place, Time, and Situation  Patient's baseline mobility: Baseline Mobility: Independent   Code Status: Full Code   Cardiac Rhythm:       Is patient on baseline Oxygen: no how many Liters:   Abnormal Assessment Findings:     Isolation: None      NIH Score:    C-SSRS: Risk of Suicide: No Risk  Bedside swallow:        Active LDA's:   Peripheral IV 05/02/25 Left Antecubital (Active)   Site Assessment Clean, dry & intact 05/02/25 1215   Line Status Blood return noted;Brisk blood return;Flushed;Normal saline locked;Specimen collected 05/02/25 1215   Phlebitis Assessment No symptoms 05/02/25 1215   Infiltration Assessment 0 05/02/25 1215   Dressing Status New dressing applied;Clean, dry & intact 05/02/25 1215   Dressing Type Transparent 05/02/25 1215   Dressing Intervention New 05/02/25 1215     Patient admitted with a ramirez:  If the ramirez is chronic was it exchanged  Reason for ramirez:   Patient admitted with Central Line:  . PICC line placement confirmed: YES OR NO:[id number]}   Reason for Central line:   Was central line Inserted from an outside facility:        Family/Caregiver Present no Any Concerns: no   Restraints no  Sitter no         Vitals: MEWS Score: 1    Vitals:    05/02/25 1211 05/02/25 1314 05/02/25 1351 05/02/25 1445   BP: (!) 147/91 125/73  120/65   Pulse: 97 78  75   Resp: 18 15 17 11   Temp: 97 °F (36.1 °C)      TempSrc: Oral      SpO2: 98% 96%

## 2025-05-02 NOTE — ED PROVIDER NOTES
[facility] EMERGENCY DEPARTMENT  EMERGENCY DEPARTMENT ENCOUNTER        Pt Name: Rosette Judge  MRN: [id number]  Birthdate 1970  Date of evaluation: 5/2/2025  Provider: FLACO Vaca  PCP: Elkin Hughes MD  Note Started: 3:29 PM EDT 5/2/25       I have seen and evaluated this patient with my supervising physician Emi Caldera MD.      CHIEF COMPLAINT       Chief Complaint   Patient presents with    Palpitations     Pt states for the past couple days her heart has been beating really fast. Pt states she feels off. Pt states it is worse after she eats. Pt had scan on her gallbladder checked on Friday, but the surgeon told her they don't want to take it out yet until she gets another scan. Denies cardiac history         HISTORY OF PRESENT ILLNESS: 1 or more Elements     History From: Patient      Rosette Judge is a 54 y.o. female who presents to the emergency department complaining of palpitations.  She states for the past couple of days she feels like her heart has been racing.  She reports her episodes are very intermittent.  She is getting 2 to 3/day.  She states they primarily happen at rest however have woken her up in her sleep and will worsen with activity.  The episodes typically last for several hours.  She describes it as feeling like her heart is racing, she will have a little bit of chest pressure and a little bit of shortness of breath.  She denies any cardiac history but does take metoprolol.  She states she is seeing a general surgeon for possible cholecystectomy.  Denies any abdominal pain, nausea or vomiting.  No dysuria, hematuria, fevers or chills.  No history of blood clot, no leg swelling, no recent travel.    Nursing Notes were all reviewed and agreed with or any disagreements were addressed in the HPI.    REVIEW OF SYSTEMS :      Review of Systems    Positives and Pertinent negatives as per HPI.     SURGICAL HISTORY     Past Surgical History:   Procedure Laterality 
per the Radiologist below, if available at the time of this note:    XR CHEST PORTABLE   Final Result      Clear lungs.      Normal cardiomediastinal silhouette.      Electronically signed by Adam Singer          EMERGENCY DEPARTMENT COURSE and DIFFERENTIAL DIAGNOSIS/MDM:   Patient seen and evaluated. Old records reviewed and pertinent information included in HPI. Labs and imaging reviewed and results discussed with patient.      Overall well appearing patient, in no acute distress, presenting for multiple episodes of near syncope and palpitations over the past week.  History obtained from patient.  Physical exam unremarkable.     Patient's pertinent external medical records: Records Reviewed : None    Chronic Medical Conditions that may contribute to presentation today:  has a past medical history of Acid reflux, Anxiety, IBS (irritable bowel syndrome), PONV (postoperative nausea and vomiting), Prolonged emergence from general anesthesia, Salmonella (06/10/2018), Shoulder injury, and Unspecified cerebral artery occlusion with cerebral infarction.     Social Determinants affecting Dx or Tx:    Social History     Socioeconomic History    Marital status:      Spouse name: Not on file    Number of children: Not on file    Years of education: Not on file    Highest education level: Not on file   Occupational History    Not on file   Tobacco Use    Smoking status: Never    Smokeless tobacco: Never   Vaping Use    Vaping status: Never Used   Substance and Sexual Activity    Alcohol use: Yes     Alcohol/week: 2.0 - 3.0 standard drinks of alcohol     Types: 2 - 3 Cans of beer per week     Comment: daily    Drug use: No    Sexual activity: Not on file   Other Topics Concern    Not on file   Social History Narrative    Not on file     Social Drivers of Health     Financial Resource Strain: Not on file   Food Insecurity: Not on file   Transportation Needs: Not on file   Physical Activity: Inactive (3/6/2020)

## 2025-05-03 ENCOUNTER — CLINICAL DOCUMENTATION (OUTPATIENT)
Age: 55
End: 2025-05-03

## 2025-05-03 ENCOUNTER — APPOINTMENT (OUTPATIENT)
Dept: NUCLEAR MEDICINE | Age: 55
DRG: 310 | End: 2025-05-03
Payer: COMMERCIAL

## 2025-05-03 ENCOUNTER — APPOINTMENT (OUTPATIENT)
Age: 55
DRG: 310 | End: 2025-05-03
Payer: COMMERCIAL

## 2025-05-03 PROBLEM — R00.2 PALPITATIONS: Status: ACTIVE | Noted: 2025-05-03

## 2025-05-03 PROBLEM — R55 NEAR SYNCOPE: Status: ACTIVE | Noted: 2025-05-03

## 2025-05-03 PROBLEM — E87.6 HYPOKALEMIA: Status: ACTIVE | Noted: 2025-05-03

## 2025-05-03 LAB
ANION GAP SERPL CALCULATED.3IONS-SCNC: 8 MMOL/L (ref 3–16)
BUN SERPL-MCNC: 10 MG/DL (ref 7–20)
CALCIUM SERPL-MCNC: 9.3 MG/DL (ref 8.3–10.6)
CHLORIDE SERPL-SCNC: 106 MMOL/L (ref 99–110)
CHOLEST SERPL-MCNC: 199 MG/DL (ref 0–199)
CO2 SERPL-SCNC: 26 MMOL/L (ref 21–32)
CREAT SERPL-MCNC: 0.6 MG/DL (ref 0.6–1.1)
DEPRECATED RDW RBC AUTO: 13.4 % (ref 12.4–15.4)
ECHO BSA: 1.65 M2
GFR SERPLBLD CREATININE-BSD FMLA CKD-EPI: >90 ML/MIN/{1.73_M2}
GLUCOSE BLD-MCNC: 111 MG/DL (ref 70–99)
GLUCOSE BLD-MCNC: 116 MG/DL (ref 70–99)
GLUCOSE BLD-MCNC: 92 MG/DL (ref 70–99)
GLUCOSE SERPL-MCNC: 95 MG/DL (ref 70–99)
HCT VFR BLD AUTO: 40.7 % (ref 36–48)
HDLC SERPL-MCNC: 64 MG/DL (ref 40–60)
HGB BLD-MCNC: 13.8 G/DL (ref 12–16)
LDLC SERPL CALC-MCNC: 124 MG/DL
MAGNESIUM SERPL-MCNC: 2.3 MG/DL (ref 1.8–2.4)
MCH RBC QN AUTO: 32.9 PG (ref 26–34)
MCHC RBC AUTO-ENTMCNC: 33.9 G/DL (ref 31–36)
MCV RBC AUTO: 97.1 FL (ref 80–100)
NUC STRESS EJECTION FRACTION: 63 %
NUC STRESS LV EDV: 63 ML (ref 56–104)
NUC STRESS LV ESV: 23 ML (ref 19–49)
NUC STRESS LV MASS: 107 G
PERFORMED ON: ABNORMAL
PERFORMED ON: ABNORMAL
PERFORMED ON: NORMAL
PLATELET # BLD AUTO: 240 K/UL (ref 135–450)
PMV BLD AUTO: 9 FL (ref 5–10.5)
POTASSIUM SERPL-SCNC: 4.1 MMOL/L (ref 3.5–5.1)
RBC # BLD AUTO: 4.19 M/UL (ref 4–5.2)
SODIUM SERPL-SCNC: 140 MMOL/L (ref 136–145)
STRESS BASELINE DIAS BP: 88 MMHG
STRESS BASELINE HR: 70 BPM
STRESS BASELINE SYS BP: 125 MMHG
STRESS ESTIMATED WORKLOAD: 10.1 METS
STRESS EXERCISE DUR MIN: 8 MIN
STRESS PEAK DIAS BP: 81 MMHG
STRESS PEAK SYS BP: 180 MMHG
STRESS PERCENT HR ACHIEVED: 95 %
STRESS POST PEAK HR: 157 BPM
STRESS RATE PRESSURE PRODUCT: NORMAL BPM*MMHG
STRESS TARGET HR: 165 BPM
TRIGL SERPL-MCNC: 56 MG/DL (ref 0–150)
VLDLC SERPL CALC-MCNC: 11 MG/DL
WBC # BLD AUTO: 3.4 K/UL (ref 4–11)

## 2025-05-03 PROCEDURE — 93016 CV STRESS TEST SUPVJ ONLY: CPT | Performed by: INTERNAL MEDICINE

## 2025-05-03 PROCEDURE — 93017 CV STRESS TEST TRACING ONLY: CPT

## 2025-05-03 PROCEDURE — 36415 COLL VENOUS BLD VENIPUNCTURE: CPT

## 2025-05-03 PROCEDURE — 78452 HT MUSCLE IMAGE SPECT MULT: CPT | Performed by: INTERNAL MEDICINE

## 2025-05-03 PROCEDURE — 6360000002 HC RX W HCPCS: Performed by: INTERNAL MEDICINE

## 2025-05-03 PROCEDURE — A9502 TC99M TETROFOSMIN: HCPCS | Performed by: INTERNAL MEDICINE

## 2025-05-03 PROCEDURE — 2500000003 HC RX 250 WO HCPCS: Performed by: INTERNAL MEDICINE

## 2025-05-03 PROCEDURE — 83735 ASSAY OF MAGNESIUM: CPT

## 2025-05-03 PROCEDURE — 1200000000 HC SEMI PRIVATE

## 2025-05-03 PROCEDURE — 6370000000 HC RX 637 (ALT 250 FOR IP): Performed by: INTERNAL MEDICINE

## 2025-05-03 PROCEDURE — 85027 COMPLETE CBC AUTOMATED: CPT

## 2025-05-03 PROCEDURE — 93018 CV STRESS TEST I&R ONLY: CPT | Performed by: INTERNAL MEDICINE

## 2025-05-03 PROCEDURE — 99222 1ST HOSP IP/OBS MODERATE 55: CPT | Performed by: INTERNAL MEDICINE

## 2025-05-03 PROCEDURE — 80048 BASIC METABOLIC PNL TOTAL CA: CPT

## 2025-05-03 PROCEDURE — 3430000000 HC RX DIAGNOSTIC RADIOPHARMACEUTICAL: Performed by: INTERNAL MEDICINE

## 2025-05-03 PROCEDURE — 78452 HT MUSCLE IMAGE SPECT MULT: CPT

## 2025-05-03 PROCEDURE — 80061 LIPID PANEL: CPT

## 2025-05-03 RX ORDER — IBUPROFEN 400 MG/1
400 TABLET, FILM COATED ORAL EVERY 6 HOURS PRN
Status: DISCONTINUED | OUTPATIENT
Start: 2025-05-03 | End: 2025-05-04 | Stop reason: HOSPADM

## 2025-05-03 RX ORDER — METOPROLOL TARTRATE 25 MG/1
12.5 TABLET, FILM COATED ORAL ONCE
Status: COMPLETED | OUTPATIENT
Start: 2025-05-03 | End: 2025-05-03

## 2025-05-03 RX ORDER — NAPROXEN 500 MG/1
500 TABLET ORAL PRN
COMMUNITY

## 2025-05-03 RX ADMIN — ACETAMINOPHEN 650 MG: 325 TABLET ORAL at 04:47

## 2025-05-03 RX ADMIN — ASPIRIN 81 MG: 81 TABLET, CHEWABLE ORAL at 07:38

## 2025-05-03 RX ADMIN — TETROFOSMIN 10.1 MILLICURIE: 1.38 INJECTION, POWDER, LYOPHILIZED, FOR SOLUTION INTRAVENOUS at 07:35

## 2025-05-03 RX ADMIN — SODIUM CHLORIDE, PRESERVATIVE FREE 10 ML: 5 INJECTION INTRAVENOUS at 20:02

## 2025-05-03 RX ADMIN — SODIUM CHLORIDE, PRESERVATIVE FREE 10 ML: 5 INJECTION INTRAVENOUS at 07:38

## 2025-05-03 RX ADMIN — TETROFOSMIN 31 MILLICURIE: 1.38 INJECTION, POWDER, LYOPHILIZED, FOR SOLUTION INTRAVENOUS at 08:55

## 2025-05-03 RX ADMIN — METOPROLOL TARTRATE 12.5 MG: 25 TABLET, FILM COATED ORAL at 20:01

## 2025-05-03 RX ADMIN — ACETAMINOPHEN 650 MG: 325 TABLET ORAL at 20:45

## 2025-05-03 RX ADMIN — REGADENOSON 0.4 MG: 0.08 INJECTION, SOLUTION INTRAVENOUS at 08:55

## 2025-05-03 ASSESSMENT — PAIN DESCRIPTION - LOCATION
LOCATION: HEAD
LOCATION: HEAD

## 2025-05-03 ASSESSMENT — PAIN SCALES - WONG BAKER
WONGBAKER_NUMERICALRESPONSE: NO HURT
WONGBAKER_NUMERICALRESPONSE: NO HURT

## 2025-05-03 ASSESSMENT — PAIN DESCRIPTION - DESCRIPTORS
DESCRIPTORS: THROBBING
DESCRIPTORS: ACHING

## 2025-05-03 ASSESSMENT — PAIN SCALES - GENERAL
PAINLEVEL_OUTOF10: 3
PAINLEVEL_OUTOF10: 3
PAINLEVEL_OUTOF10: 1
PAINLEVEL_OUTOF10: 0
PAINLEVEL_OUTOF10: 0

## 2025-05-03 ASSESSMENT — PAIN DESCRIPTION - PAIN TYPE: TYPE: ACUTE PAIN

## 2025-05-03 ASSESSMENT — PAIN - FUNCTIONAL ASSESSMENT: PAIN_FUNCTIONAL_ASSESSMENT: ACTIVITIES ARE NOT PREVENTED

## 2025-05-03 ASSESSMENT — PAIN DESCRIPTION - ORIENTATION: ORIENTATION: MID;ANTERIOR

## 2025-05-03 ASSESSMENT — PAIN DESCRIPTION - ONSET: ONSET: ON-GOING

## 2025-05-03 ASSESSMENT — PAIN DESCRIPTION - FREQUENCY: FREQUENCY: CONTINUOUS

## 2025-05-03 NOTE — PLAN OF CARE
Problem: Chronic Conditions and Co-morbidities  Goal: Patient's chronic conditions and co-morbidity symptoms are monitored and maintained or improved  5/3/2025 0923 by Rosalia Clark RN  Outcome: Progressing     Problem: Discharge Planning  Goal: Discharge to home or other facility with appropriate resources  5/3/2025 0923 by Rosalia Clark RN  Outcome: Progressing     Problem: Pain  Goal: Verbalizes/displays adequate comfort level or baseline comfort level  5/3/2025 0923 by Rosalia Clark RN  Outcome: Progressing

## 2025-05-03 NOTE — PROGRESS NOTES
Hospital Medicine Progress Note  V 5.1      Date of Admission: 5/2/2025    Hospital Day: 2      Chief Admission Complaint: Palpitation and chest discomfort    Subjective: She is sitting up in her room, denies any chest pain states palpitations were stable overnight.  She is aware she is to go today for Nuclear stress test    Presenting Admission History:       54 y.o. female who with PMH significant for palpitations, Migraines.  She reports she history of palpitations and has been on metoprolol for several years and she is prescribed this by [facility] cardiology.  She reports that over the past week she has been having increased feeling of having palpitations she can feel her heart beating fast and this often wakes her from sleep.  She states this is associated with weakness and lightheadedness.  She reports that she feels that she is going to pass out and is very weak and lightheaded.  She denied any syncopal episodes.  These are associated with chest pressure present in the center of her chest.     She presented to the ED for these symptoms and is  admitted for further evaluation and management       Assessment/Plan:      Palpitations: History is noted, she states this has been ongoing for several years and has been prescribed metoprolol 25 mg daily by cardiology at [facility].  Symptoms worsening over the past week with associated weakness, lightheadedness.  Continue to follow on telemetry.  Follow electrolytes.  Echocardiogram  ordered and completed on 5/2/25 and reviewed :  Normal left ventricular systolic function with a visually estimated EF of 60 - 65%. Left ventricle size is normal. Normal wall thickness. Normal wall motion. Normal diastolic function.     Cardiology consult for any further input.     Chest pain : Noted history she describes pain in the center of her chest pressure-like intensity.  Troponin reviewed mildly elevated.  EKG without any concerns at this time.  Continue on daily aspirin continue

## 2025-05-03 NOTE — PLAN OF CARE
Problem: Chronic Conditions and Co-morbidities  Goal: Patient's chronic conditions and co-morbidity symptoms are monitored and maintained or improved  Outcome: Progressing     Problem: Discharge Planning  Goal: Discharge to home or other facility with appropriate resources  Outcome: Progressing     Problem: Pain  Goal: Verbalizes/displays adequate comfort level or baseline comfort level  Outcome: Progressing     Continue with plan of care.

## 2025-05-03 NOTE — CONSULTS
Meds:   metoprolol tartrate  25 mg Oral Daily    sodium chloride flush  5-40 mL IntraVENous 2 times per day    aspirin  81 mg Oral Daily    enoxaparin  40 mg SubCUTAneous Daily     Continuous Infusions:   sodium chloride       PRN Meds:.ibuprofen, sodium chloride flush, sodium chloride, ondansetron **OR** ondansetron, acetaminophen **OR** acetaminophen, polyethylene glycol, sulfur hexafluoride microspheres     Patient Active Problem List    Diagnosis Date Noted    Hyperlipidemia 12/06/2022    Chest pain 12/05/2022    Near syncope 05/03/2025    Palpitations 05/03/2025    Hypokalemia 05/03/2025      Active Hospital Problems    Diagnosis Date Noted    Chest pain [R07.9] 12/05/2022     Priority: Medium    Near syncope [R55] 05/03/2025    Palpitations [R00.2] 05/03/2025    Hypokalemia [E87.6] 05/03/2025       Assessment:   Palpitation  Lightheadedness  Noncardiac chest pain  Hypokalemia and hypomagnesemia  Plan:  Recommend continuation of beta-blocker.  EKG is unremarkable.  Troponin is negative.  She had 0 calcium score in end of 2022 with no coronary stenosis on CT angio.  30-day event monitor to assess for arrhythmia.  She wants to get the monitor done here.  Will set her up for Monday.  Replace electrolytes and monitor.  From cardiology she can be discharged if stress test is negative..    I independently reviewed and interpreted all relevant labs  , telemetry, [facility] stress test   CXR,   and use them for decision making whether mentioned or not.  All previous relevant notes including notes and data from other hospitals and prior records were reviewed.     Thank you for allowing me to participate in the care of Rosette Judge     NOTE: This report was transcribed using voice recognition software. Every effort was made to ensure accuracy, however, inadvertent computerized transcription errors may be present.

## 2025-05-04 VITALS
BODY MASS INDEX: 28.11 KG/M2 | HEIGHT: 60 IN | HEART RATE: 72 BPM | TEMPERATURE: 98.2 F | SYSTOLIC BLOOD PRESSURE: 125 MMHG | WEIGHT: 143.2 LBS | DIASTOLIC BLOOD PRESSURE: 76 MMHG | RESPIRATION RATE: 14 BRPM | OXYGEN SATURATION: 99 %

## 2025-05-04 LAB
ANION GAP SERPL CALCULATED.3IONS-SCNC: 9 MMOL/L (ref 3–16)
BUN SERPL-MCNC: 12 MG/DL (ref 7–20)
CALCIUM SERPL-MCNC: 9.5 MG/DL (ref 8.3–10.6)
CHLORIDE SERPL-SCNC: 107 MMOL/L (ref 99–110)
CO2 SERPL-SCNC: 26 MMOL/L (ref 21–32)
CREAT SERPL-MCNC: 0.6 MG/DL (ref 0.6–1.1)
EKG ATRIAL RATE: 68 BPM
EKG DIAGNOSIS: NORMAL
EKG P AXIS: 30 DEGREES
EKG P-R INTERVAL: 172 MS
EKG Q-T INTERVAL: 390 MS
EKG QRS DURATION: 78 MS
EKG QTC CALCULATION (BAZETT): 414 MS
EKG R AXIS: 20 DEGREES
EKG T AXIS: 47 DEGREES
EKG VENTRICULAR RATE: 68 BPM
GFR SERPLBLD CREATININE-BSD FMLA CKD-EPI: >90 ML/MIN/{1.73_M2}
GLUCOSE BLD-MCNC: 83 MG/DL (ref 70–99)
GLUCOSE SERPL-MCNC: 92 MG/DL (ref 70–99)
MAGNESIUM SERPL-MCNC: 1.92 MG/DL (ref 1.8–2.4)
PERFORMED ON: NORMAL
POTASSIUM SERPL-SCNC: 4.2 MMOL/L (ref 3.5–5.1)
SODIUM SERPL-SCNC: 142 MMOL/L (ref 136–145)

## 2025-05-04 PROCEDURE — 6370000000 HC RX 637 (ALT 250 FOR IP): Performed by: INTERNAL MEDICINE

## 2025-05-04 PROCEDURE — 2500000003 HC RX 250 WO HCPCS: Performed by: INTERNAL MEDICINE

## 2025-05-04 PROCEDURE — 6360000002 HC RX W HCPCS: Performed by: INTERNAL MEDICINE

## 2025-05-04 PROCEDURE — 36415 COLL VENOUS BLD VENIPUNCTURE: CPT

## 2025-05-04 PROCEDURE — 80048 BASIC METABOLIC PNL TOTAL CA: CPT

## 2025-05-04 PROCEDURE — 83735 ASSAY OF MAGNESIUM: CPT

## 2025-05-04 PROCEDURE — 93005 ELECTROCARDIOGRAM TRACING: CPT | Performed by: INTERNAL MEDICINE

## 2025-05-04 RX ORDER — MAGNESIUM SULFATE 1 G/100ML
1000 INJECTION INTRAVENOUS ONCE
Status: COMPLETED | OUTPATIENT
Start: 2025-05-04 | End: 2025-05-04

## 2025-05-04 RX ORDER — LANOLIN ALCOHOL/MO/W.PET/CERES
200 CREAM (GRAM) TOPICAL DAILY
Status: DISCONTINUED | OUTPATIENT
Start: 2025-05-04 | End: 2025-05-04 | Stop reason: HOSPADM

## 2025-05-04 RX ORDER — METOPROLOL TARTRATE 25 MG/1
TABLET, FILM COATED ORAL
Qty: 60 TABLET | Refills: 2 | Status: SHIPPED | OUTPATIENT
Start: 2025-05-04

## 2025-05-04 RX ORDER — LANOLIN ALCOHOL/MO/W.PET/CERES
200 CREAM (GRAM) TOPICAL DAILY
Qty: 30 TABLET | Refills: 2 | Status: SHIPPED | OUTPATIENT
Start: 2025-05-04

## 2025-05-04 RX ADMIN — METOPROLOL TARTRATE 25 MG: 25 TABLET, FILM COATED ORAL at 08:18

## 2025-05-04 RX ADMIN — ASPIRIN 81 MG: 81 TABLET, CHEWABLE ORAL at 08:18

## 2025-05-04 RX ADMIN — Medication 200 MG: at 11:30

## 2025-05-04 RX ADMIN — MAGNESIUM SULFATE HEPTAHYDRATE 1000 MG: 1 INJECTION, SOLUTION INTRAVENOUS at 09:04

## 2025-05-04 RX ADMIN — SODIUM CHLORIDE, PRESERVATIVE FREE 10 ML: 5 INJECTION INTRAVENOUS at 08:19

## 2025-05-04 ASSESSMENT — PAIN SCALES - GENERAL: PAINLEVEL_OUTOF10: 0

## 2025-05-04 NOTE — DISCHARGE SUMMARY
normal. Normal wall thickness. Normal wall motion. Normal diastolic function.      Cardiology was consulted and noted and appreciated.  Cardiology did recommend to continue her on beta-blocker.  Her dose was increased slightly and at discharge she is to stay 25 mg a.m. and 12.5 mg every evening.  Received this for a day in the hospital and tolerated it well.  She will follow-up with her PCP within the next 1 to 2 weeks.  Cardiology is recommending a 30-day event monitor to assess for any further arrhythmias..  Cardiology is to call her tomorrow to arrange for this to be completed.    Discussed the plan with the patient and she expressed understanding     Chest pain : Noted history she described pain in the center of her chest pressure-like intensity.  Troponin reviewed and was  mildly elevated.  EKG without any concerns for ischemia.  She did have a nuclear stress test completed this admission  Results were normal LV size and function.  Normal myocardial perfusion imaging.  No EKG evidence for ischemia was noted.      Hypokalemia: Repleted and stable at time of DC..     Hypomagnesemia:  Repleted and stable at DC.        Migraine headaches : History noted currently stable and will follow       Physical Exam Performed:      /76   Pulse 72   Temp 98.2 °F (36.8 °C) (Oral)   Resp 14   Ht 1.524 m (5')   Wt 65 kg (143 lb 3.2 oz)   SpO2 99%   BMI 27.97 kg/m²     General appearance: She is sitting up in room , in no acute distress, is alert and cooperative.  HEENT:  Pupils equal, round, and reactive to light. Conjunctivae/corneas clear.  Respiratory:  Normal respiratory effort. Clear to auscultation bilaterally without Rales/Wheezes/Rhonchi.  Cardiovascular:  Regular rate and rhythm with normal S1/S2   Abdomen:  Soft, non-tender, non-distended with + bowel sounds.  Musculoskeletal:  No clubbing, cyanosis or edema bilaterally.  Full range of motion without deformity.  Neurologic:  Neurovascularly intact without

## 2025-05-04 NOTE — PROGRESS NOTES
Patient discharged. IV removed, telemetry box and leads removed and returned. Lockbox emptied. All belongings gathered and returned to patient. Discharge instructions reviewed with patient, all questions answered by RN. Prescriptions sent to [facility] in [place].  No further needs.

## 2025-05-04 NOTE — CARE COORDINATION
D/c order noted. Spoke with Queta Reynoso. Stated patient should have no DCP needs. Will have a ride. Plan to  event monitor at cardiology office tomorrow. She will provide office contact info. Andie Hitchcock RN

## 2025-05-04 NOTE — PLAN OF CARE
Problem: Chronic Conditions and Co-morbidities  Goal: Patient's chronic conditions and co-morbidity symptoms are monitored and maintained or improved  Outcome: Progressing  Flowsheets (Taken 5/4/2025 0817)  Care Plan - Patient's Chronic Conditions and Co-Morbidity Symptoms are Monitored and Maintained or Improved: Monitor and assess patient's chronic conditions and comorbid symptoms for stability, deterioration, or improvement     Problem: Discharge Planning  Goal: Discharge to home or other facility with appropriate resources  Outcome: Progressing  Flowsheets (Taken 5/4/2025 0817)  Discharge to home or other facility with appropriate resources: Identify barriers to discharge with patient and caregiver     Problem: Pain  Goal: Verbalizes/displays adequate comfort level or baseline comfort level  5/4/2025 1101 by Queta Quinones, RN  Outcome: Progressing  Flowsheets (Taken 5/4/2025 0816)  Verbalizes/displays adequate comfort level or baseline comfort level: Assess pain using appropriate pain scale  5/4/2025 0653 by Jair Garcia RN  Outcome: Progressing     Problem: Neurosensory - Adult  Goal: Achieves stable or improved neurological status  Outcome: Progressing  Flowsheets (Taken 5/4/2025 0817)  Achieves stable or improved neurological status: Assess for and report changes in neurological status     Problem: Cardiovascular - Adult  Goal: Maintains optimal cardiac output and hemodynamic stability  Outcome: Progressing  Flowsheets (Taken 5/4/2025 0817)  Maintains optimal cardiac output and hemodynamic stability: Monitor blood pressure and heart rate     Problem: Gastrointestinal - Adult  Goal: Minimal or absence of nausea and vomiting  Outcome: Progressing  Flowsheets (Taken 5/4/2025 0817)  Minimal or absence of nausea and vomiting: Provide nonpharmacologic comfort measures as appropriate

## 2025-05-04 NOTE — PROGRESS NOTES
Assessment complete and charted earlier in shift. Pt denies palpitations or CP overnight. VSS on RA. PO metoprolol started per AM MD. Pt tolerated well. Call light within reach, will continue to monitor.

## 2025-05-04 NOTE — PLAN OF CARE
Problem: Chronic Conditions and Co-morbidities  Goal: Patient's chronic conditions and co-morbidity symptoms are monitored and maintained or improved  5/4/2025 1119 by Queta Quinones RN  Outcome: Completed  5/4/2025 1101 by Queta Quinones RN  Outcome: Progressing  Flowsheets (Taken 5/4/2025 0817)  Care Plan - Patient's Chronic Conditions and Co-Morbidity Symptoms are Monitored and Maintained or Improved: Monitor and assess patient's chronic conditions and comorbid symptoms for stability, deterioration, or improvement     Problem: Discharge Planning  Goal: Discharge to home or other facility with appropriate resources  5/4/2025 1119 by Queta Quinones RN  Outcome: Completed  5/4/2025 1101 by Queta Quinones RN  Outcome: Progressing  Flowsheets (Taken 5/4/2025 0817)  Discharge to home or other facility with appropriate resources: Identify barriers to discharge with patient and caregiver     Problem: Pain  Goal: Verbalizes/displays adequate comfort level or baseline comfort level  5/4/2025 1119 by Queta Quinones RN  Outcome: Completed  5/4/2025 1101 by Queta Quinones RN  Outcome: Progressing  Flowsheets (Taken 5/4/2025 0816)  Verbalizes/displays adequate comfort level or baseline comfort level: Assess pain using appropriate pain scale  5/4/2025 0653 by Jair Garcia RN  Outcome: Progressing     Problem: Neurosensory - Adult  Goal: Achieves stable or improved neurological status  5/4/2025 1119 by Queta Quinones RN  Outcome: Completed  5/4/2025 1101 by Queta Quinones RN  Outcome: Progressing  Flowsheets (Taken 5/4/2025 0817)  Achieves stable or improved neurological status: Assess for and report changes in neurological status     Problem: Cardiovascular - Adult  Goal: Maintains optimal cardiac output and hemodynamic stability  5/4/2025 1119 by Queta Quinones RN  Outcome: Completed  5/4/2025 1101 by Queta Quinones RN  Outcome: Progressing  Flowsheets (Taken 5/4/2025 0817)  Maintains optimal cardiac output and hemodynamic

## 2025-05-05 ENCOUNTER — TELEPHONE (OUTPATIENT)
Dept: CARDIAC CATH/INVASIVE PROCEDURES | Age: 55
End: 2025-05-05

## 2025-05-05 DIAGNOSIS — R00.2 PALPITATIONS: Primary | ICD-10-CM

## 2025-05-05 NOTE — TELEPHONE ENCOUNTER
Please call the patient and see if she wants to come to the office at [facility] or [facility] to get a 30 day event monitor or if she wants it mailed to her.  Thanks.

## 2025-08-01 ENCOUNTER — TRANSCRIBE ORDERS (OUTPATIENT)
Dept: ADMINISTRATIVE | Age: 55
End: 2025-08-01

## 2025-08-01 DIAGNOSIS — R10.11 RUQ PAIN: Primary | ICD-10-CM
